# Patient Record
Sex: MALE | Race: BLACK OR AFRICAN AMERICAN | NOT HISPANIC OR LATINO | Employment: UNEMPLOYED | URBAN - METROPOLITAN AREA
[De-identification: names, ages, dates, MRNs, and addresses within clinical notes are randomized per-mention and may not be internally consistent; named-entity substitution may affect disease eponyms.]

---

## 2017-08-03 ENCOUNTER — OFFICE VISIT (OUTPATIENT)
Dept: INTERNAL MEDICINE | Age: 57
End: 2017-08-03

## 2017-08-03 VITALS
HEIGHT: 68 IN | TEMPERATURE: 97 F | BODY MASS INDEX: 29.83 KG/M2 | WEIGHT: 196.8 LBS | OXYGEN SATURATION: 98 % | DIASTOLIC BLOOD PRESSURE: 88 MMHG | SYSTOLIC BLOOD PRESSURE: 132 MMHG | HEART RATE: 72 BPM

## 2017-08-03 DIAGNOSIS — E10.9 TYPE 1 DIABETES MELLITUS WITHOUT COMPLICATION (HCC): ICD-10-CM

## 2017-08-03 DIAGNOSIS — I10 ESSENTIAL HYPERTENSION: Primary | ICD-10-CM

## 2017-08-03 DIAGNOSIS — Z76.89 ENCOUNTER TO ESTABLISH CARE: ICD-10-CM

## 2017-08-03 DIAGNOSIS — Z00.00 PREVENTATIVE HEALTH CARE: ICD-10-CM

## 2017-08-03 DIAGNOSIS — E78.5 HYPERLIPIDEMIA, UNSPECIFIED HYPERLIPIDEMIA TYPE: ICD-10-CM

## 2017-08-03 PROBLEM — D33.2 BENIGN BRAIN TUMOR (HCC): Status: ACTIVE | Noted: 2017-08-03

## 2017-08-03 LAB
25(OH)D3+25(OH)D2 SERPL-MCNC: 29.4 NG/ML (ref 30–100)
ALBUMIN SERPL-MCNC: 4.5 G/DL (ref 3.5–5.2)
ALBUMIN/GLOB SERPL: 1.5 G/DL
ALP SERPL-CCNC: 70 U/L (ref 39–117)
ALT SERPL-CCNC: 29 U/L (ref 1–41)
APPEARANCE UR: CLEAR
AST SERPL-CCNC: 28 U/L (ref 1–40)
BASOPHILS # BLD AUTO: 0.02 10*3/MM3 (ref 0–0.2)
BASOPHILS NFR BLD AUTO: 0.5 % (ref 0–1.5)
BILIRUB SERPL-MCNC: 0.4 MG/DL (ref 0.1–1.2)
BILIRUB UR QL STRIP: NEGATIVE
BUN SERPL-MCNC: 15 MG/DL (ref 6–20)
BUN/CREAT SERPL: 13.9 (ref 7–25)
CALCIUM SERPL-MCNC: 10.2 MG/DL (ref 8.6–10.5)
CHLORIDE SERPL-SCNC: 100 MMOL/L (ref 98–107)
CHOLEST SERPL-MCNC: 137 MG/DL (ref 0–200)
CHOLEST/HDLC SERPL: 2.28 {RATIO}
CO2 SERPL-SCNC: 28.1 MMOL/L (ref 22–29)
COLOR UR: YELLOW
CREAT SERPL-MCNC: 1.08 MG/DL (ref 0.76–1.27)
EOSINOPHIL # BLD AUTO: 0.28 10*3/MM3 (ref 0–0.7)
EOSINOPHIL NFR BLD AUTO: 7.2 % (ref 0.3–6.2)
ERYTHROCYTE [DISTWIDTH] IN BLOOD BY AUTOMATED COUNT: 13.9 % (ref 11.5–14.5)
GLOBULIN SER CALC-MCNC: 3.1 GM/DL
GLUCOSE SERPL-MCNC: 185 MG/DL (ref 65–99)
GLUCOSE UR QL: NEGATIVE
HBA1C MFR BLD: 7.9 % (ref 4.8–5.6)
HCT VFR BLD AUTO: 40.9 % (ref 40.4–52.2)
HDLC SERPL-MCNC: 60 MG/DL (ref 40–60)
HGB BLD-MCNC: 13.4 G/DL (ref 13.7–17.6)
HGB UR QL STRIP: NEGATIVE
IMM GRANULOCYTES # BLD: 0 10*3/MM3 (ref 0–0.03)
IMM GRANULOCYTES NFR BLD: 0 % (ref 0–0.5)
KETONES UR QL STRIP: NEGATIVE
LDLC SERPL CALC-MCNC: 64 MG/DL (ref 0–100)
LEUKOCYTE ESTERASE UR QL STRIP: NEGATIVE
LYMPHOCYTES # BLD AUTO: 0.87 10*3/MM3 (ref 0.9–4.8)
LYMPHOCYTES NFR BLD AUTO: 22.3 % (ref 19.6–45.3)
MCH RBC QN AUTO: 27.4 PG (ref 27–32.7)
MCHC RBC AUTO-ENTMCNC: 32.8 G/DL (ref 32.6–36.4)
MCV RBC AUTO: 83.6 FL (ref 79.8–96.2)
MONOCYTES # BLD AUTO: 0.43 10*3/MM3 (ref 0.2–1.2)
MONOCYTES NFR BLD AUTO: 11 % (ref 5–12)
NEUTROPHILS # BLD AUTO: 2.3 10*3/MM3 (ref 1.9–8.1)
NEUTROPHILS NFR BLD AUTO: 59 % (ref 42.7–76)
NITRITE UR QL STRIP: NEGATIVE
PH UR STRIP: 6.5 [PH] (ref 5–8)
PLATELET # BLD AUTO: 273 10*3/MM3 (ref 140–500)
POTASSIUM SERPL-SCNC: 4.9 MMOL/L (ref 3.5–5.2)
PROT SERPL-MCNC: 7.6 G/DL (ref 6–8.5)
PROT UR QL STRIP: NEGATIVE
PSA SERPL-MCNC: 1.18 NG/ML (ref 0–4)
RBC # BLD AUTO: 4.89 10*6/MM3 (ref 4.6–6)
SODIUM SERPL-SCNC: 140 MMOL/L (ref 136–145)
SP GR UR: 1.01 (ref 1–1.03)
T4 FREE SERPL-MCNC: 1.13 NG/DL (ref 0.93–1.7)
TRIGL SERPL-MCNC: 67 MG/DL (ref 0–150)
TSH SERPL DL<=0.005 MIU/L-ACNC: 1.62 MIU/ML (ref 0.27–4.2)
UROBILINOGEN UR STRIP-MCNC: NORMAL MG/DL
VLDLC SERPL CALC-MCNC: 13.4 MG/DL (ref 5–40)
WBC # BLD AUTO: 3.9 10*3/MM3 (ref 4.5–10.7)

## 2017-08-03 PROCEDURE — 99204 OFFICE O/P NEW MOD 45 MIN: CPT | Performed by: NURSE PRACTITIONER

## 2017-08-03 RX ORDER — LISINOPRIL 10 MG/1
10 TABLET ORAL DAILY
COMMUNITY
End: 2017-08-31 | Stop reason: SDUPTHER

## 2017-08-03 RX ORDER — CETIRIZINE HYDROCHLORIDE 10 MG/1
10 TABLET ORAL DAILY
COMMUNITY
End: 2017-08-31 | Stop reason: SDUPTHER

## 2017-08-03 RX ORDER — ASPIRIN 81 MG/1
81 TABLET ORAL DAILY
COMMUNITY

## 2017-08-03 RX ORDER — MULTIPLE VITAMINS W/ MINERALS TAB 9MG-400MCG
1 TAB ORAL DAILY
COMMUNITY

## 2017-08-03 RX ORDER — HYDROCHLOROTHIAZIDE 25 MG/1
25 TABLET ORAL DAILY
COMMUNITY
End: 2017-08-31 | Stop reason: SDUPTHER

## 2017-08-03 RX ORDER — SIMVASTATIN 20 MG
20 TABLET ORAL NIGHTLY
COMMUNITY
End: 2017-08-31 | Stop reason: SDUPTHER

## 2017-08-03 NOTE — PROGRESS NOTES
"Fred Nagel / 56 y.o. / male  08/03/2017    VITALS:    /88  Pulse 72  Temp 97 °F (36.1 °C)  Ht 68\" (172.7 cm)  Wt 196 lb 12.8 oz (89.3 kg)  SpO2 98%  BMI 29.92 kg/m2  BP Readings from Last 3 Encounters:   08/03/17 132/88     Wt Readings from Last 3 Encounters:   08/03/17 196 lb 12.8 oz (89.3 kg)      Body mass index is 29.92 kg/(m^2).    CC: Main reason(s) for today's visit: Establish Care (new pt to establish care)      HPI:    Patient is a 56 y.o. male who is here to establish care. Patient is a previous patient of Dr. Huff ([facility]) while in [place]. Last visit was in April 2017. Recently moved to [place] in May 2017. His daughter works as a pediatrician in [place] with [employer].     Hypertension: Patient here for follow-up of elevated blood pressure. He is exercising and is adherent to low salt diet.  Blood pressure is well controlled at home. Cardiac symptoms none. Patient denies none.  Cardiovascular risk factors: advanced age (older than 55 for men, 65 for women), diabetes mellitus, hypertension and male gender. Use of agents associated with hypertension: none. History of target organ damage: none.  Patient does not check Blood pressure at home.     Diabetes Mellitus: Patient presents for follow up of diabetes. Symptoms: none. Symptoms have stabilized. Patient denies none.  Evaluation to date has been included: fasting blood sugar, fasting lipid panel, hemoglobin A1C and microalbuminuria.  Home sugars: BGs have been labile ranging between 68 and 280. Treatment to date: Continued insulin which has been effective.   Previous endocrinologist Dr. Goldsmith, last seen in April 2017.         Patient Care Team:  MARCELLA Prasad as PCP - General (Internal Medicine)  ____________________________________________________________________    ASSESSMENT & PLAN:    1. Essential hypertension    - CBC & Differential  - Comprehensive Metabolic Panel  - Hemoglobin A1c  - Lipid Panel " With / Chol / HDL Ratio  - PSA  - TSH+Free T4  - Urinalysis With / Microscopic If Indicated  - Vitamin D 25 Hydroxy    2. Hyperlipidemia, unspecified hyperlipidemia type    - Lipid Panel With / Chol / HDL Ratio    3. Type 1 diabetes mellitus without complication    - CBC & Differential  - Comprehensive Metabolic Panel  - Hemoglobin A1c  - Ambulatory Referral to Endocrinology  - Ambulatory Referral to Ophthalmology    4. Preventative health care    - CBC & Differential  - Comprehensive Metabolic Panel  - Hemoglobin A1c  - Lipid Panel With / Chol / HDL Ratio  - PSA  - TSH+Free T4  - Urinalysis With / Microscopic If Indicated  - Vitamin D 25 Hydroxy    5. Encounter to establish care    - CBC & Differential  - Comprehensive Metabolic Panel  - Hemoglobin A1c  - Lipid Panel With / Chol / HDL Ratio  - PSA  - TSH+Free T4  - Urinalysis With / Microscopic If Indicated  - Vitamin D 25 Hydroxy      Summary/Discussion:   I personally reviewed patient's previous most recent lab results from his previous provider at [place] from March 2017.  Last hemoglobin A1c was 7.8.  Other labs are all within normal limits, including CMP, CBC, microalbumin and urine, lipid panel, PSA.  I will obtain labs today as well.  Patient to sign a records release form to obtain previous medical records from office in [place].  He does report that he has a benign brain nodule that was being monitored with scans every few years, unknown last scan. I also provided patient with information regarding triple screen.      Return in about 3 months (around 11/3/2017) for Recheck, Next scheduled follow up.    Future Appointments  Date Time Provider Department Center   11/2/2017 8:00 AM MARCELLA Prasad [facility] None       ____________________________________________________________________    REVIEW OF SYSTEMS    Review of Systems   Constitutional: Negative for chills and fever.   Respiratory: Negative for apnea, chest tightness and shortness  of breath.    Cardiovascular: Negative for chest pain, palpitations and leg swelling.   Gastrointestinal: Negative for nausea and vomiting.   Neurological: Negative for dizziness, light-headedness and headaches.   Psychiatric/Behavioral: Negative for self-injury, sleep disturbance and suicidal ideas. The patient is not nervous/anxious.      Other: As noted per HPI      PHYSICAL EXAMINATION    Physical Exam   Constitutional: He is oriented to person, place, and time. Vital signs are normal. He appears well-developed and well-nourished. He is cooperative. He does not appear ill. No distress.   Cardiovascular: Normal rate, regular rhythm, S1 normal, S2 normal and normal heart sounds.    No murmur heard.  Pulmonary/Chest: Effort normal and breath sounds normal. He has no decreased breath sounds. He has no wheezes. He has no rhonchi. He has no rales.   Neurological: He is alert and oriented to person, place, and time.   Skin: Skin is warm, dry and intact.   Psychiatric: He has a normal mood and affect. His speech is normal and behavior is normal. Judgment and thought content normal. Cognition and memory are normal.   Nursing note and vitals reviewed.      REVIEWED DATA:    Labs:   No results found for: NA, K, AST, ALT, BUN, CREATININE, EGFRIFNONA, EGFRIFAFRI    No results found for: GLU, HGBA1C, MICROALBUR    No results found for: LDL, HDL, TRIG, CHOLHDLRATIO    No results found for: TSH, FREET4     No results found for: WBC, HGB, PLT      Imaging:        Medical Tests:        Summary of old records / correspondence / consultant report:        Request outside records:        ______________________________________________________________________    No Known Allergies    No current outpatient prescriptions on file prior to visit.     No current facility-administered medications on file prior to visit.        PFSH:     The following portions of the patient's history were reviewed and updated as appropriate: Allergies /  Current Medications / Past Medical History / Surgical History / Social History / Family History    Patient Active Problem List   Diagnosis   • Type 1 diabetes mellitus without complication   • Hyperlipidemia   • Essential hypertension   • Benign brain tumor       Past Medical History:   Diagnosis Date   • Allergic    • Benign brain tumor    • Diabetes mellitus 2012    Type 1 Diabetes   • DKA, type 1    • Hyperlipidemia    • Hypertension        Past Surgical History:   Procedure Laterality Date   • ACHILLES TENDON REPAIR         Social History     Social History   • Marital status:      Spouse name: N/A   • Number of children: N/A   • Years of education: N/A     Social History Main Topics   • Smoking status: Former Smoker     Years: 20.00     Quit date: 1996   • Smokeless tobacco: Never Used   • Alcohol use No      Comment: quit 1989   • Drug use: No   • Sexual activity: Yes     Partners: Female      Comment:  (wife)      Other Topics Concern   • None     Social History Narrative   • None       Family History   Problem Relation Age of Onset   • Cancer Mother    • Cancer Father    • Hypertension Sister    • Hypertension Brother          **Dragon Disclaimer:   Much of this encounter note is an electronic transcription/translation of spoken language to printed text. The electronic translation of spoken language may permit erroneous, or at times, nonsensical words or phrases to be inadvertently transcribed. Although I have reviewed the note for such errors, some may still exist.

## 2017-08-04 ENCOUNTER — TELEPHONE (OUTPATIENT)
Dept: INTERNAL MEDICINE | Age: 57
End: 2017-08-04

## 2017-08-04 NOTE — TELEPHONE ENCOUNTER
Called pt regarding lab results. Pt was informed of all normal lab results. Pt was informed of slightly low Vit D level; pt was advised to take OTC Vit D3 1000iu daily to maintain Vit D.  Pt demonstrated understanding.    KD

## 2017-08-04 NOTE — TELEPHONE ENCOUNTER
"----- Message from MARCELLA Prasad sent at 8/4/2017  7:40 AM EDT -----  Please call patient with results.     Your CBC looks normal, no sign of anemia or platelet disorder.   Your CMP labs are all normal. This is a measure of kidney function, electrolytes, and liver function. Your fasting glucose is elevated at 185.   Hemoglobin A1c is 7.9  Your cholesterol/lipid levels look good at this point. Total cholesterol is perfect at 137 (should be less than 200). LDL cholesterol (\"bad cholesterol\") and HDL cholesterol (\"good cholesterol\") are both at normal levels.   Your PSA (prostate level) is in normal range.   Your tests for thyroid function are normal.   Your urinalysis is normal, no sign of infection, blood, or protein in the urine.    Vitamin D level is just barely low (29.4, normal > 30). You can take OTC Vitamin D3 1000iu daily if you would like to maintain/ slightly increase this.             "

## 2017-08-17 ENCOUNTER — OFFICE VISIT (OUTPATIENT)
Dept: ENDOCRINOLOGY | Age: 57
End: 2017-08-17

## 2017-08-17 VITALS
WEIGHT: 196 LBS | DIASTOLIC BLOOD PRESSURE: 78 MMHG | HEIGHT: 68 IN | BODY MASS INDEX: 29.7 KG/M2 | SYSTOLIC BLOOD PRESSURE: 124 MMHG

## 2017-08-17 DIAGNOSIS — I10 ESSENTIAL HYPERTENSION: ICD-10-CM

## 2017-08-17 DIAGNOSIS — E10.9 TYPE 1 DIABETES MELLITUS WITHOUT COMPLICATION (HCC): Primary | ICD-10-CM

## 2017-08-17 DIAGNOSIS — E78.2 MIXED HYPERLIPIDEMIA: ICD-10-CM

## 2017-08-17 PROCEDURE — 99204 OFFICE O/P NEW MOD 45 MIN: CPT | Performed by: NURSE PRACTITIONER

## 2017-08-17 NOTE — PATIENT INSTRUCTIONS
home blood tests -  Blood glucose testin times daily, that are: fasting- 1st thing in morning before eating or drinking, before each meal and 1 or 2 hours after meal  Bedtime, anytime you feel symptoms of hyperglycemia or hypoglycemia (high or low blood sugars)    New instructions for basal insulIn  Lantus U100 10 units in PM  Increase the PM dose 1 units every 1 days if fasting blood glucose is over 110 mg/dl     1 unit for every 35 above 150 mg/dl - this is done with each meal  4 units before breakfast, 4 units before lunch, 4 units before dinner

## 2017-08-17 NOTE — PROGRESS NOTES
Fred Nagel who presents for for evaluation and treatment of Type 1 diabetes mellitus insulin dependent. The initial diagnosis of diabetes was made 6 years ago.  Patient reports that he was diagnosed as a physical at work after signs and symptoms of fatigue weakness and having to drink energy drinks for energy.  At that primary care providers visit his blood glucose was running 500 mg/Sid.    The condition of diabetes location is system with the course being clinical course has fluctuated , the severity is Moderate , and the modifying/allievating factors are  insulin. Insulin dosage review with Fred Nagel suggested compliance most of the time   .       Associated symptoms of hyperglycemia have been none.  Associated symptoms of hypoglycemia have been hunger and weakness. Patient reports  hypoglycemia threshold for symptoms is 60 mg/dl .       The patient is currently taking home blood tests - Blood glucose testin-3 times daily, that are:  fasting- 1st thing in morning before eating or drinking  before each meal   basal insulIn  Lantus U100 20 units in PM  Novolog U100 4 units with each meal plus s/s     Compliance with blood glucose monitoring: good.     Exercise:daily with meal panning: The patient is using avoidance of concentrated sweets.    Home blood glucose testing daily: 2-3  FB to 160 mg/dl  before lunch 200 to 250 mg/dl  before dinner/supper 200 to 250 mg/dl    Patterns reported per patient are none. Patient reports hypoglycemia with activity or when eats late.       The following portions of the patient's history were reviewed and updated as appropriate: current medications, past family history, past medical history, past social history, past surgical history and problem list.        Medical records, chart, and labs reviewed from primary care provider.      Current Outpatient Prescriptions:   •  aspirin 81 MG EC tablet, Take 81 mg by mouth Daily., Disp: , Rfl:   •  cetirizine (zyrTEC) 10  "MG tablet, Take 10 mg by mouth Daily., Disp: , Rfl:   •  glucose blood (PRECISION XTRA TEST STRIPS) test strip, 1 each by Other route As Needed. Use as instructed, Disp: , Rfl:   •  hydrochlorothiazide (HYDRODIURIL) 25 MG tablet, Take 25 mg by mouth Daily., Disp: , Rfl:   •  insulin aspart (NOVOLOG FLEXPEN) 100 UNIT/ML solution pen-injector sc pen, Inject 4 Units under the skin 3 (Three) Times a Day With Meals. Per sliding scale , Disp: , Rfl:   •  Insulin Glargine (LANTUS SOLOSTAR) 100 UNIT/ML injection pen, Inject 20 Units under the skin Every Night., Disp: , Rfl:   •  lisinopril (PRINIVIL,ZESTRIL) 10 MG tablet, Take 10 mg by mouth Daily., Disp: , Rfl:   •  Multiple Vitamins-Minerals (MULTIVITAMIN WITH MINERALS) tablet tablet, Take 1 tablet by mouth Daily., Disp: , Rfl:   •  simvastatin (ZOCOR) 20 MG tablet, Take 20 mg by mouth Every Night., Disp: , Rfl:     Patient Active Problem List    Diagnosis   • Type 1 diabetes mellitus without complication [E10.9]   • Hyperlipidemia [E78.5]   • Essential hypertension [I10]   • Benign brain tumor [D33.2]       Review of Systems  A comprehensive review of the 14 systems was negative except of listed below:  Genitourinary:positive for frequency and nocturia  Endocrine: hyperglycemia     Objective:     Wt Readings from Last 3 Encounters:   08/17/17 196 lb (88.9 kg)   08/03/17 196 lb 12.8 oz (89.3 kg)     Temp Readings from Last 3 Encounters:   08/03/17 97 °F (36.1 °C)     BP Readings from Last 3 Encounters:   08/17/17 124/78   08/03/17 132/88     Pulse Readings from Last 3 Encounters:   08/03/17 72         /78  Ht 68\" (172.7 cm)  Wt 196 lb (88.9 kg)  BMI 29.8 kg/m2    General appearance:  alert, cooperative,orientated, , fatigued, nourished\" \"appears stated age and cooperative\" \"NAD   Neck: no carotid bruit, supple, symmetrical, trachea midline and thyroid not enlarged, symmetric, no tenderness/mass/nodules   Thyroid:  no palpable nodule, no tenderness, no " "enlargement,    Lung:  \"clear to auscultation bilaterally\" \"no abnormal breath sounds  \" effort was normal, no labored breath, no use of accessory muscles.   Heart: regular rate and rhythm, S1, S2 normal, no murmur, click, rub or gallop      Abdomen:  normal bowel sounds- 4 quads, soft non-tender and contour - slt rounded      Extremities: [extremities normal, atraumatic, no cyanosis or edema\" WNL - gait and station, strength and stability\"          Skin:   Pulses:  warm and dry, no hyperpigmentation, normal skin coloring, or suspicious lesions  2+ and symmetric   Neuro: Alert and oriented x3. Gait normal. Reflexes and motor strength normal and symmetric. Cranial nerves 2-12 and sensation grossly intact.      Psych: behavior - normal, judgement - normal, mood - normal, affect - normal                 Lab Review  Results for orders placed or performed in visit on 08/03/17   Comprehensive Metabolic Panel   Result Value Ref Range    Glucose 185 (H) 65 - 99 mg/dL    BUN 15 6 - 20 mg/dL    Creatinine 1.08 0.76 - 1.27 mg/dL    eGFR Non African Am 71 >60 mL/min/1.73    eGFR African Am 86 >60 mL/min/1.73    BUN/Creatinine Ratio 13.9 7.0 - 25.0    Sodium 140 136 - 145 mmol/L    Potassium 4.9 3.5 - 5.2 mmol/L    Chloride 100 98 - 107 mmol/L    Total CO2 28.1 22.0 - 29.0 mmol/L    Calcium 10.2 8.6 - 10.5 mg/dL    Total Protein 7.6 6.0 - 8.5 g/dL    Albumin 4.50 3.50 - 5.20 g/dL    Globulin 3.1 gm/dL    A/G Ratio 1.5 g/dL    Total Bilirubin 0.4 0.1 - 1.2 mg/dL    Alkaline Phosphatase 70 39 - 117 U/L    AST (SGOT) 28 1 - 40 U/L    ALT (SGPT) 29 1 - 41 U/L   Hemoglobin A1c   Result Value Ref Range    Hemoglobin A1C 7.90 (H) 4.80 - 5.60 %   Lipid Panel With / Chol / HDL Ratio   Result Value Ref Range    Total Cholesterol 137 0 - 200 mg/dL    Triglycerides 67 0 - 150 mg/dL    HDL Cholesterol 60 40 - 60 mg/dL    VLDL Cholesterol 13.4 5 - 40 mg/dL    LDL Cholesterol  64 0 - 100 mg/dL    Chol/HDL Ratio 2.28    PSA   Result Value Ref " Range    PSA 1.180 0.000 - 4.000 ng/mL   TSH+Free T4   Result Value Ref Range    TSH 1.620 0.270 - 4.200 mIU/mL    Free T4 1.13 0.93 - 1.70 ng/dL   Urinalysis With / Microscopic If Indicated   Result Value Ref Range    Specific Gravity, UA 1.015 1.005 - 1.030    pH, UA 6.5 5.0 - 8.0    Color, UA Yellow     Appearance, UA Clear Clear    Leukocytes, UA Negative Negative    Protein Negative Negative    Glucose, UA Negative Negative    Ketones Negative Negative    Blood, UA Negative Negative    Bilirubin, UA Negative Negative    Urobilinogen, UA Comment     Nitrite, UA Negative Negative   Vitamin D 25 Hydroxy   Result Value Ref Range    25 Hydroxy, Vitamin D 29.4 (L) 30.0 - 100.0 ng/mL   CBC & Differential   Result Value Ref Range    WBC 3.90 (L) 4.50 - 10.70 10*3/mm3    RBC 4.89 4.60 - 6.00 10*6/mm3    Hemoglobin 13.4 (L) 13.7 - 17.6 g/dL    Hematocrit 40.9 40.4 - 52.2 %    MCV 83.6 79.8 - 96.2 fL    MCH 27.4 27.0 - 32.7 pg    MCHC 32.8 32.6 - 36.4 g/dL    RDW 13.9 11.5 - 14.5 %    Platelets 273 140 - 500 10*3/mm3    Neutrophil Rel % 59.0 42.7 - 76.0 %    Lymphocyte Rel % 22.3 19.6 - 45.3 %    Monocyte Rel % 11.0 5.0 - 12.0 %    Eosinophil Rel % 7.2 (H) 0.3 - 6.2 %    Basophil Rel % 0.5 0.0 - 1.5 %    Neutrophils Absolute 2.30 1.90 - 8.10 10*3/mm3    Lymphocytes Absolute 0.87 (L) 0.90 - 4.80 10*3/mm3    Monocytes Absolute 0.43 0.20 - 1.20 10*3/mm3    Eosinophils Absolute 0.28 0.00 - 0.70 10*3/mm3    Basophils Absolute 0.02 0.00 - 0.20 10*3/mm3    Immature Granulocyte Rel % 0.0 0.0 - 0.5 %    Immature Grans Absolute 0.00 0.00 - 0.03 10*3/mm3         Assessment:   Fred was seen today for diabetes.    Diagnoses and all orders for this visit:    Type 1 diabetes mellitus without complication  -     Cancel: Comprehensive Metabolic Panel  -     C-Peptide  -     Cancel: Hemoglobin A1c  -     Insulin, Total  -     Microalbumin / Creatinine Urine Ratio  -     Uric Acid  -     Cancel: Vitamin D 25 Hydroxy  -     Cancel: TSH  -      Cancel: Thyroid Antibodies  -     Cancel: T4, Free  -     Cancel: T4  -     Cancel: T3, Free  -     Cancel: T3  -     Glutamic Acid Decarboxylase  -     Anti-islet Cell Antibody    Essential hypertension  -     Cancel: Comprehensive Metabolic Panel    Mixed hyperlipidemia  -     Cancel: Comprehensive Metabolic Panel  -     Cancel: Lipid Panel        Plan:    In Summary: Fred Nagel who presents for for evaluation and treatment of Type 1 diabetes mellitus insulin dependent. The initial diagnosis of diabetes was made 6 years ago.  Patient reports that he was diagnosed as a physical at work after signs and symptoms of fatigue weakness and having to drink energy drinks for energy.  At that primary care providers visit his blood glucose was running 500 mg/Sid. Patient was also tired upon diagnosis that he was making insulin but eventually his body would stop making insulin so therefore he was diagnosed as a type I diabetic.  The condition of diabetes and the course being clinical course has been steady with the severity is Moderately controlled.  The modifying/allievating factors have been  Insulin.  Current medication regiment is: basal insulIn  Lantus U100 20 units in PM and Novolog U100 4 units with each meal plus s/s- at 200 he takes 2 units, 2 50-3 units and 300 and above 4 units.  Insulin dosage review with Fred Nagel suggested compliance most of the time . The patient reports associated symptoms of hyperglycemia have been none and associated symptoms of hypoglycemia have been hunger and weakness. Patient reports  hypoglycemia threshold for symptoms is 60 mg/dl . The patient is currently taking home blood tests - Blood glucose testin-3 times daily. The reported values are: FB to 160 mg/dl, before lunch 200 to 250 mg/dl, and before dinner/supper 200 to 250 mg/dl. Patterns reported per patient are none. Patient reports hypoglycemia with activity or when eats late. The patient's history were  reviewed and updated as appropriate: current medications, past family history, past medical history, past social history, past surgical history and problem list.   There is no significant history of diabetes in the family. Medical records, chart, and labs reviewed from primary care provider.  Recent lab work was reviewed from primary care provider dated August 3, 2017 at this time additional lab work will be obtained and treat as indicated with results.  The medication changes today were as follows:      New instructions for basal insulIn  Lantus U100 10 units in PM  Increase the PM dose 1 units every 1 days if fasting blood glucose is over 110 mg/dl     1 unit for every 35 above 150 mg/dl - this is done with each meal  4 units before breakfast, 4 units before lunch, 4 units before dinner        home blood tests -  Blood glucose testin-6 times daily, that are:  fasting- 1st thing in morning before eating or drinking  before each meal and 1 or 2 hours after meal  bedtime  anytime you feel symptoms of hyperglycemia or hypoglycemia (high or low blood sugars)        Education:  interpretation of lab results, blood sugar goals, complications of diabetes mellitus, hypoglycemia prevention and treatment, exercise, illness management, self-monitoring of blood glucose skills, nutrition, carbohydrate counting, site rotation, use of insulin pen, insulin adjustments, self-injection of insulin, use of sliding scale/correction formula and use of insulin: carb ratio    Instructions were given and education was given on continuous glucose monitoring, and insulin pump therapy.  Future treatment possible after lab work will be obtained for possible 670 G insulin pump with sensor.  We will need log books and lab work to determine eligibility.    Return in about 6 weeks (around 2017).        Dragon transcription disclaimer     Much of this encounter note is an electronic transcription/translation of spoken language to printed  text. The electronic translation of spoken language may permit erroneous, or at times, nonsensical words or phrases to be inadvertently transcribed. Although I have reviewed the note for such errors, some may still exist.

## 2017-08-22 LAB
C PEPTIDE SERPL-MCNC: 0.6 NG/ML (ref 1.1–4.4)
GAD65 AB SER IA-ACNC: NORMAL U/ML (ref 0–5)
INSULIN SERPL-ACNC: 5.4 UIU/ML (ref 2.6–24.9)
PANC ISLET CELL AB TITR SER: ABNORMAL {TITER}
URATE SERPL-MCNC: 4.4 MG/DL (ref 3.4–7)

## 2017-08-31 ENCOUNTER — TELEPHONE (OUTPATIENT)
Dept: INTERNAL MEDICINE | Age: 57
End: 2017-08-31

## 2017-08-31 DIAGNOSIS — E78.2 MIXED HYPERLIPIDEMIA: ICD-10-CM

## 2017-08-31 DIAGNOSIS — J30.2 SEASONAL ALLERGIC RHINITIS, UNSPECIFIED ALLERGIC RHINITIS TRIGGER: ICD-10-CM

## 2017-08-31 DIAGNOSIS — I10 ESSENTIAL HYPERTENSION: Primary | ICD-10-CM

## 2017-08-31 RX ORDER — SIMVASTATIN 20 MG
20 TABLET ORAL NIGHTLY
Qty: 90 TABLET | Refills: 0 | Status: SHIPPED | OUTPATIENT
Start: 2017-08-31 | End: 2017-11-08 | Stop reason: SDUPTHER

## 2017-08-31 RX ORDER — HYDROCHLOROTHIAZIDE 25 MG/1
25 TABLET ORAL DAILY
Qty: 90 TABLET | Refills: 0 | Status: SHIPPED | OUTPATIENT
Start: 2017-08-31 | End: 2017-11-08 | Stop reason: SDUPTHER

## 2017-08-31 RX ORDER — CETIRIZINE HYDROCHLORIDE 10 MG/1
10 TABLET ORAL DAILY
Qty: 90 TABLET | Refills: 0 | Status: SHIPPED | OUTPATIENT
Start: 2017-08-31 | End: 2017-11-08 | Stop reason: SDUPTHER

## 2017-08-31 RX ORDER — LISINOPRIL 10 MG/1
10 TABLET ORAL DAILY
Qty: 90 TABLET | Refills: 0 | Status: SHIPPED | OUTPATIENT
Start: 2017-08-31 | End: 2017-11-08 | Stop reason: SDUPTHER

## 2017-10-19 ENCOUNTER — OFFICE VISIT (OUTPATIENT)
Dept: ENDOCRINOLOGY | Age: 57
End: 2017-10-19

## 2017-10-19 VITALS
DIASTOLIC BLOOD PRESSURE: 80 MMHG | WEIGHT: 199 LBS | SYSTOLIC BLOOD PRESSURE: 118 MMHG | BODY MASS INDEX: 30.16 KG/M2 | HEIGHT: 68 IN

## 2017-10-19 DIAGNOSIS — I10 ESSENTIAL HYPERTENSION: ICD-10-CM

## 2017-10-19 DIAGNOSIS — E10.9 TYPE 1 DIABETES MELLITUS WITHOUT COMPLICATION (HCC): Primary | ICD-10-CM

## 2017-10-19 DIAGNOSIS — E78.2 MIXED HYPERLIPIDEMIA: ICD-10-CM

## 2017-10-19 DIAGNOSIS — Z23 NEED FOR INFLUENZA VACCINATION: ICD-10-CM

## 2017-10-19 PROCEDURE — 90471 IMMUNIZATION ADMIN: CPT | Performed by: NURSE PRACTITIONER

## 2017-10-19 PROCEDURE — 90656 IIV3 VACC NO PRSV 0.5 ML IM: CPT | Performed by: NURSE PRACTITIONER

## 2017-10-19 PROCEDURE — 99214 OFFICE O/P EST MOD 30 MIN: CPT | Performed by: NURSE PRACTITIONER

## 2017-10-19 NOTE — PROGRESS NOTES
Fred Nagel  presents to the office  for the follow-up appointment for Type 1 diabetes mellitus.     The diabete's condition location is throughout the system with the  clinical course has fluctuated, the severity is Mild, and the modifying/allievating factors are insulin.  Medications and  Dosages were  reviewed with Fred Nagel and suggested that compliance most of the time.    Associated symptoms of hyperglycemia have been none.  Associated symptoms of hypoglycemia have been hunger and weakness. Patient reports  hypoglycemia threshold for symptoms is 60 mg/dl .     The patient is currently on insulin.    Compliance with blood glucose monitoring: good.     Meal panning: The patient is using avoidance of concentrated sweets.    The patient is currently taking home blood tests - Blood glucose testing: 3-4 times daily, that are:  fasting- 1st thing in morning before eating or drinking  before each meal  bedtime  anytime you feel symptoms of hyperglycemia or hypoglycemia (high or low blood sugars)   basal insulIn  Lantus U100 16 units in PM  Novolog U100 4 units with each meal plus s/s    Last instructions given were:   New instructions for basal insulIn  Lantus U100 10 units in PM  Increase the PM dose 1 units every 1 days if fasting blood glucose is over 110 mg/dl      1 unit for every 35 above 150 mg/dl - this is done with each meal  4 units before breakfast, 4 units before lunch, 4 units before dinner    Home blood glucose testing daily: 3-4  FB to 120 mg/dl - averages 120 mg/dl  before lunch 170 to 180 mg/dl  before dinner/supper 200 to 220 mg/dl  bedtime 105 to 130 mg/dl    Last reported blood glucose readings are:   Home blood glucose testing daily: 2-3  FB to 160 mg/dl  before lunch 200 to 250 mg/dl  before dinner/supper 200 to 250 mg/dl    Patterns reported per patient are that he wakes up with BS higher than it was when he went to bed.          The following portions of the patient's history  were reviewed and updated as appropriate: current medications, past family history, past medical history, past social history, past surgical history and problem list.      Current Outpatient Prescriptions:   •  aspirin 81 MG EC tablet, Take 81 mg by mouth Daily., Disp: , Rfl:   •  cetirizine (zyrTEC) 10 MG tablet, Take 1 tablet by mouth Daily., Disp: 90 tablet, Rfl: 0  •  glucose blood (PRECISION XTRA TEST STRIPS) test strip, 1 each by Other route As Needed. Use as instructed, Disp: , Rfl:   •  hydrochlorothiazide (HYDRODIURIL) 25 MG tablet, Take 1 tablet by mouth Daily., Disp: 90 tablet, Rfl: 0  •  insulin aspart (NOVOLOG FLEXPEN) 100 UNIT/ML solution pen-injector sc pen, Inject 4 Units under the skin 3 (Three) Times a Day With Meals. Per sliding scale , Disp: , Rfl:   •  Insulin Glargine (LANTUS SOLOSTAR) 100 UNIT/ML injection pen, Inject 20 Units under the skin Every Night., Disp: , Rfl:   •  Insulin Pen Needle 31G X 4 MM misc, 1 pen 4 (Four) Times a Day., Disp: 200 each, Rfl: 5  •  lisinopril (PRINIVIL,ZESTRIL) 10 MG tablet, Take 1 tablet by mouth Daily., Disp: 90 tablet, Rfl: 0  •  Multiple Vitamins-Minerals (MULTIVITAMIN WITH MINERALS) tablet tablet, Take 1 tablet by mouth Daily., Disp: , Rfl:   •  simvastatin (ZOCOR) 20 MG tablet, Take 1 tablet by mouth Every Night., Disp: 90 tablet, Rfl: 0    Patient Active Problem List    Diagnosis   • Mixed hyperlipidemia [E78.2]   • Seasonal allergies [J30.2]   • Type 1 diabetes mellitus without complication [E10.9]   • Hyperlipidemia [E78.5]   • Essential hypertension [I10]   • Benign brain tumor [D33.2]       Review of Systems   A comprehensive review of the 14 systems was negative except of listed below:  Endocrine: hyperglycemia     Objective:     Wt Readings from Last 3 Encounters:   10/19/17 199 lb (90.3 kg)   08/17/17 196 lb (88.9 kg)   08/03/17 196 lb 12.8 oz (89.3 kg)     Temp Readings from Last 3 Encounters:   08/03/17 97 °F (36.1 °C)     BP Readings from Last 3  "Encounters:   10/19/17 118/80   08/17/17 124/78   08/03/17 132/88     Pulse Readings from Last 3 Encounters:   08/03/17 72        /80  Ht 68\" (172.7 cm)  Wt 199 lb (90.3 kg)  BMI 30.26 kg/m2    General appearance:  alert, cooperative, delirious, fatigued, nourished\" \"appears stated age and cooperative\" \"NAD   Neck: no carotid bruit, supple, symmetrical, trachea midline and thyroid not enlarged, symmetric, no tenderness/mass/nodules   Thyroid:  no palpable nodule, no enlargement, no tenderness   Lung:  \"clear to auscultation bilaterally\" \"no abnormal breath sounds  \" effort was normal, no labored breath, no use of accessory muscles.   Heart: regular rate and rhythm, S1, S2 normal, no murmur, click, rub or gallop      Abdomen:  normal bowel sounds- 4 quads, soft non-tender and contour - slt rounded      Extremities: extremities normal, atraumatic, no cyanosis or edema extremities normal, atraumatic, no cyanosis or edema\" WNL - gait and station, strength and stability\"       Skin:   Pulses:  warm and dry, no hyperpigmentation, normal skin coloring, or suspicious lesions   2+ and symmetric   Neuro: Alert and oriented x3. Gait normal. Reflexes and motor strength normal and symmetric. Cranial nerves 2-12 and sensation grossly intact.      Psych: behavior - normal, judgement - normal, mood - normal, affect - normal                 Lab Review  Results for orders placed or performed in visit on 08/17/17   C-Peptide   Result Value Ref Range    C-Peptide 0.6 (L) 1.1 - 4.4 ng/mL   Insulin, Total   Result Value Ref Range    Insulin 5.4 2.6 - 24.9 uIU/mL   Uric Acid   Result Value Ref Range    Uric Acid 4.4 3.4 - 7.0 mg/dL   Glutamic Acid Decarboxylase   Result Value Ref Range    [id number] See below. 0.0 - 5.0 U/mL   Anti-islet Cell Antibody   Result Value Ref Range    Islet Cell Ab 1:8 (H) Neg:<1:1           Assessment:   Fred was seen today for follow-up.    Diagnoses and all orders for this visit:    Type 1 diabetes " mellitus without complication    Essential hypertension    Mixed hyperlipidemia          Plan:   In Summary/medication changes:  home blood tests -  Blood glucose testin times daily, that are: fasting- 1st thing in morning before eating or drinking, before each meal and 1 or 2 hours after meal  Bedtime, anytime you feel symptoms of hyperglycemia or hypoglycemia (high or low blood sugars)    New instructions for basal insulIn  Lantus U100 18 units in PM  Increase the PM dose 1 units every 1 days if fasting blood glucose is over 100 mg/dl     Novolog 1unit for every 25 above 120 mg/dl - this is done with each meal  10 units before breakfast, 10 units before lunch, 10 units before dinner          Education:  interpretation of lab results, blood sugar goals, complications of diabetes mellitus, hypoglycemia prevention and treatment, exercise, illness management, self-monitoring of blood glucose skills, nutrition, carbohydrate counting, site rotation, use of insulin pen, insulin adjustments, self-injection of insulin, use of sliding scale/correction formula and use of insulin: carb ratio        Office visit 25 minutes with additional education given 13 minutes - SMBG with goals, medication changes with purposes and s/e profiles.       Return in about 3 months (around 2018).      Dragon transcription disclaimer     Much of this encounter note is an electronic transcription/translation of spoken language to printed text. The electronic translation of spoken language may permit erroneous, or at times, nonsensical words or phrases to be inadvertently transcribed. Although I have reviewed the note for such errors, some may still exist.

## 2017-10-19 NOTE — PATIENT INSTRUCTIONS
home blood tests -  Blood glucose testin times daily, that are: fasting- 1st thing in morning before eating or drinking, before each meal and 1 or 2 hours after meal  Bedtime, anytime you feel symptoms of hyperglycemia or hypoglycemia (high or low blood sugars)    New instructions for basal insulIn  Lantus U100 18 units in PM  Increase the PM dose 1 units every 1 days if fasting blood glucose is over 100 mg/dl     Novolog 1unit for every 25 above 120 mg/dl - this is done with each meal  10 units before breakfast, 10 units before lunch, 10 units before dinner

## 2017-10-27 ENCOUNTER — TREATMENT (OUTPATIENT)
Dept: ENDOCRINOLOGY | Age: 57
End: 2017-10-27

## 2017-10-27 DIAGNOSIS — E10.9 TYPE 1 DIABETES MELLITUS WITHOUT COMPLICATION (HCC): Primary | ICD-10-CM

## 2017-11-02 ENCOUNTER — OFFICE VISIT (OUTPATIENT)
Dept: INTERNAL MEDICINE | Age: 57
End: 2017-11-02

## 2017-11-02 VITALS
SYSTOLIC BLOOD PRESSURE: 122 MMHG | HEART RATE: 78 BPM | DIASTOLIC BLOOD PRESSURE: 80 MMHG | OXYGEN SATURATION: 98 % | HEIGHT: 68 IN | BODY MASS INDEX: 29.8 KG/M2 | WEIGHT: 196.6 LBS | TEMPERATURE: 97.8 F

## 2017-11-02 DIAGNOSIS — E78.2 MIXED HYPERLIPIDEMIA: ICD-10-CM

## 2017-11-02 DIAGNOSIS — I10 ESSENTIAL HYPERTENSION: ICD-10-CM

## 2017-11-02 DIAGNOSIS — E10.9 TYPE 1 DIABETES MELLITUS WITHOUT COMPLICATION (HCC): Primary | ICD-10-CM

## 2017-11-02 PROCEDURE — 99214 OFFICE O/P EST MOD 30 MIN: CPT | Performed by: NURSE PRACTITIONER

## 2017-11-02 RX ORDER — INSULIN GLARGINE 100 [IU]/ML
INJECTION, SOLUTION SUBCUTANEOUS
COMMUNITY
Start: 2017-10-19 | End: 2018-05-07

## 2017-11-02 NOTE — ASSESSMENT & PLAN NOTE
Last HgbA1c was 7.9 6/2017. Currently on Lantus and Novolog with meals. Managed by Endocrinology.

## 2017-11-02 NOTE — PATIENT INSTRUCTIONS
Hypertension  Hypertension, commonly called high blood pressure, is when the force of blood pumping through your arteries is too strong. Your arteries are the blood vessels that carry blood from your heart throughout your body. A blood pressure reading consists of a higher number over a lower number, such as 110/72. The higher number (systolic) is the pressure inside your arteries when your heart pumps. The lower number (diastolic) is the pressure inside your arteries when your heart relaxes. Ideally you want your blood pressure below 120/80.  Hypertension forces your heart to work harder to pump blood. Your arteries may become narrow or stiff. Having untreated or uncontrolled hypertension can cause heart attack, stroke, kidney disease, and other problems.  RISK FACTORS  Some risk factors for high blood pressure are controllable. Others are not.   Risk factors you cannot control include:   · Race. You may be at higher risk if you are .  · Age. Risk increases with age.  · Gender. Men are at higher risk than women before age 45 years. After age 65, women are at higher risk than men.  Risk factors you can control include:  · Not getting enough exercise or physical activity.  · Being overweight.  · Getting too much fat, sugar, calories, or salt in your diet.  · Drinking too much alcohol.  SIGNS AND SYMPTOMS  Hypertension does not usually cause signs or symptoms. Extremely high blood pressure (hypertensive crisis) may cause headache, anxiety, shortness of breath, and nosebleed.  DIAGNOSIS  To check if you have hypertension, your health care provider will measure your blood pressure while you are seated, with your arm held at the level of your heart. It should be measured at least twice using the same arm. Certain conditions can cause a difference in blood pressure between your right and left arms. A blood pressure reading that is higher than normal on one occasion does not mean that you need treatment. If  it is not clear whether you have high blood pressure, you may be asked to return on a different day to have your blood pressure checked again. Or, you may be asked to monitor your blood pressure at home for 1 or more weeks.  TREATMENT  Treating high blood pressure includes making lifestyle changes and possibly taking medicine. Living a healthy lifestyle can help lower high blood pressure. You may need to change some of your habits.  Lifestyle changes may include:  · Following the DASH diet. This diet is high in fruits, vegetables, and whole grains. It is low in salt, red meat, and added sugars.  · Keep your sodium intake below 2,300 mg per day.  · Getting at least 30-45 minutes of aerobic exercise at least 4 times per week.  · Losing weight if necessary.  · Not smoking.  · Limiting alcoholic beverages.  · Learning ways to reduce stress.  Your health care provider may prescribe medicine if lifestyle changes are not enough to get your blood pressure under control, and if one of the following is true:  · You are 18-59 years of age and your systolic blood pressure is above 140.  · You are 60 years of age or older, and your systolic blood pressure is above 150.  · Your diastolic blood pressure is above 90.  · You have diabetes, and your systolic blood pressure is over 140 or your diastolic blood pressure is over 90.  · You have kidney disease and your blood pressure is above 140/90.  · You have heart disease and your blood pressure is above 140/90.  Your personal target blood pressure may vary depending on your medical conditions, your age, and other factors.  HOME CARE INSTRUCTIONS  · Have your blood pressure rechecked as directed by your health care provider.    · Take medicines only as directed by your health care provider. Follow the directions carefully. Blood pressure medicines must be taken as prescribed. The medicine does not work as well when you skip doses. Skipping doses also puts you at risk for  problems.  · Do not smoke.    · Monitor your blood pressure at home as directed by your health care provider.   SEEK MEDICAL CARE IF:   · You think you are having a reaction to medicines taken.  · You have recurrent headaches or feel dizzy.  · You have swelling in your ankles.  · You have trouble with your vision.  SEEK IMMEDIATE MEDICAL CARE IF:  · You develop a severe headache or confusion.  · You have unusual weakness, numbness, or feel faint.  · You have severe chest or abdominal pain.  · You vomit repeatedly.  · You have trouble breathing.  MAKE SURE YOU:   · Understand these instructions.  · Will watch your condition.  · Will get help right away if you are not doing well or get worse.     This information is not intended to replace advice given to you by your health care provider. Make sure you discuss any questions you have with your health care provider.     Document Released: 12/18/2006 Document Revised: 05/03/2016 Document Reviewed: 10/10/2014  [email or web address] Interactive Patient Education ©2017 [employer]    ÍÓÇÈ ÇáßÑÈæåíÏÑÇÊ ÇáÃÓÇÓíÉ áãÑÖì ÇáÓßÑí  (Basic Carbohydrate Counting for Diabetes Mellitus)  íÚÏ ÍÓÇÈ ÇáßÑÈæåíÏÑÇÊ æÓíáÉ áãÊÇÈÚÉ ßãíÉ ÇáßÑÈæåíÏÑÇÊ ÇáÊí ÊÊäÇæáåÇ. Åä ÊäÇæá ÇáßÑÈæåíÏÑÇÊ ÈØÈíÚÉ ÇáÍÇá íÑÝÚ ãÓÊæì ÇáÓßÑ (ÇáÌáæßæÒ) Ýí ÇáÏã¡ áÐÇ ãä ÇáÖÑæÑí ÇáÊÚÑÝ Úáì ßãíÉ ÇáßÑÈæåíÏÑÇÊ ÇáÊí ãä ÇáãäÇÓÈ áß ÊäÇæáåÇ Ýí ßá æÌÈÉ. íÓÇÚÏ ÍÓÇÈ ÇáßÑÈæåíÏÑÇÊ Úáì ÇáÍÝÇÙ Úáì ãÓÊæíÇÊ ÇáÌáæßæÒ Ýí ÇáÏã ÚäÏ ãÚÏáÇÊå ÇáØÈíÚíÉ. æÊÎÊáÝ ßãíÉ ÇáßÑÈæåíÏÑÇÊ ÇáãÓãæÍ ÈåÇ ãä ÔÎÕ áÂÎÑ. íãßä Ãä íÓÇÚÏß ÃÎÕÇÆí ÇáÊÛÐíÉ Úáì ÇÍÊÓÇÈ ÇáßãíÉ ÇáãäÇÓÈÉ áß. ÝæÑ Ãä ÊÚáã ÇáßãíÉ ÇáãäÇÓÈ áß ÊäÇæáåÇ ãä ÇáßÑÈæåíÏÑÇÊ¡ íãßäß ÇÍÊÓÇÈ ßãíÉ ÇáßÑÈæåíÏÑÇÊ ÇáãÊæÝÑÉ Ýí ÇáØÚÇã ÇáÐí ÊÊäÇæáå.  ÊÊæÇÝÑ ÇáßÑÈæåíÏÑÇÊ Ýí ÇáÃÛÐíÉ ÇáÊÇáíÉ:  · ÇáÍÈæÈ¡ ãËá ÇáãÎÈæÒÇÊ æÇáÍÈæÈ.  · ÇáÈÞæáíÇÊ ÇáãÌÝÝÉ æãäÊÌÇÊ ÇáÕæíÇ.  · ÇáÎÖÑÇæÇÊ ÇáäÔæíÉ¡ ãËá ÇáÈØÇØÓ æÇáÈÇÒáÇÁ æÇáÐÑÉ.  · ÇáÝÇßåÉ æÚÕÇÆÑ ÇáÝÇßåÉ.  · ÇááÈä æÇáÒÈÇÏí.  · ÇáÍáæíÇÊ æÇáãÃßæáÇÊ ÇáÓÑíÚÉ¡ ãËá Çáßíß æÇáßÚß æÇáÍáæíÇÊ æÇáÈØÇØÓ  "ÇáãÞáíÉ æÇáãÔÑæÈÇÊ ÇáÓßÑíÉ æÚÕÇÆÑ ÇáÝÇßåÉ.  ÇÍÊÓÇÈ ßãíÉ ÇáßÑÈæåíÏÑÇÊ  ËãÉ ØÑíÞÊÇä áÇÍÊÓÇÈ ßãíÉ ÇáßÑÈæåíÏÑÇÊ ÇáãÊæÝÑÉ Ýí ØÚÇãß. íãßäß ÇÓÊÎÏÇã Ãí ãä ÇáØÑíÞÊíä Ãæ ßáÇåãÇ.  ÞÑÇÁÉ \"ÇáÍÞÇÆÞ ÇáÛÐÇÆíÉ\" ÇáãæÌæÏÉ Úáì ÚÈæÉ ÇáØÚÇã.  \"ÇáÍÞÇÆÞ ÇáÛÐÇÆíÉ\" ÚÈÇÑÉ Úä ÌÒÁ ãä ãáÕÞ ÇáÈíÇäÇÊ ÇáãæÌæÏ Úáì ÃÛáÈ ÇáÃØÚãÉ æÇáãÔÑæÈÇÊ ÇáãÚÈÃÉ ÏÇÎá ÇáæáÇíÇÊ ÇáãÊÍÏÉ. æåæ íÔãá ãÚáæãÇÊ Úä ÍÌã ÇáÍÕÉ ÇáæÇÍÏÉ ãä ÇáØÚÇã Ãæ ÇáãÔÑæÈ æÇáãæÇÏ ÇáÛÐÇÆíÉ ÇáÊí ÊÍÊæíåÇ ßá ÍÕÉ¡ ÈãÇ íÔãá ÚÏÏ ÌÑÇãÇÊ ÇáßÑÈæåíÏÑÇÊ ÇáãæÌæÏÉ Ýí ßá ÍÕÉ.   ÍÏÏ ÚÏÏ ÇáÍÕÕ ÇáÊí íãßä ÊäÇæáåÇ ãä åÐÇ ÇáØÚÇã Ãæ ÇáÔÑÇÈ. ÇÖÑÈ ÚÏÏ ÇáÍÕÕ Ýí ÚÏÏ ÌÑÇãÇÊ ÇáßÑÈæåíÏÑÇÊ ÇáãÏÑÌÉ Úáì ãáÕÞ ÇáÈíÇäÇÊ áåÐå ÇáÍÕÉ. ÓíãËá ÇáÅÌãÇáí ßãíÉ ÇáßÑÈæåíÏÑÇÊ ÇáÊí ÓÊÓÊåáßåÇ ÚäÏ ÊäÇæáß åÐÇ ÇáØÚÇã Ãæ ÇáÔÑÇÈ.  ÇáÊÚÑÝ Úáì ÃÍÌÇã ÇáÍÕÕ ÇáÞíÇÓíÉ ááØÚÇã  ÍíäãÇ ÊÊäÇæá ØÚÇãðÇ ÛíÑ ãÚÈÃ Ãæ áÇ íÔãá \"ÇáÍÞÇÆÞ ÇáÛÐÇÆíÉ\" Úáì ãáÕÞ ÇáÈíÇäÇÊ ÇáÎÇÕ Èå¡ ÝÓíßæä Úáíß ÇÍÊÓÇÈ ÇáÍÕÕ áÊÊãßä ãä ÇÍÊÓÇÈ ßãíÉ ÇáßÑÈæåíÏÑÇÊ. ÊÍÊæí ÇáÍÕÉ ãä ÃßËÑ ÇáÃØÚãÉ ÇáÛäíÉ ÈÇáßÑÈæåíÏÑÇÊ Úáì ãÇ íäÇåÒ 15 Ìã ãä ÇáßÑÈæåíÏÑÇÊ. ÊÊÖãä ÇáÞÇÆãÉ ÇáÊÇáíÉ ÃÍÌÇã ÍÕÕ ÇáãÃßæáÇÊ ÇáÛäíÉ ÈÇáßÑÈæåíÏÑÇÊ æÇáÊí ÊÍÊæí ßá ÍÕÉ ãäåÇ Úáì 15 Ìã ãä ÇáßÑÈæåíÏÑÇÊ:    · ÔÑíÍÉ æÇÍÏÉ ãä ÇáÎÈÒ (1 ÃæÞíÉ) Ãæ ÔÑíÍÉ æÇÍÏÉ ãä ÇáÊæÑÊíáÇ ÈÍÌã 6 ÈæÕÇÊ.    · ½ ÎÈÒ ÔØÑÉ áÍã Ãæ ãÇÝä ÅäÌáíÒí.  · 4-6 ãÞÑãÔÇÊ.  · 3/4 ßæÈ ãä ÇáÍÈæÈ ÇáÌÇÝÉ ÛíÑ ÇáãÍáÇå.    · 1/2 ßæÈ ãä ÇáÍÈæÈ ÇáÓÇÎäÉ.  · 1/3 ßæÈ ãä ÇáÃÑÒ Ãæ ÇáãßÑæäÉ.    · 1/2 ßæÈ ãä ÇáÈØÇØÓ ÇáãåÑæÓÉ Ãæ 1/4 ÍÈÉ ÈØÇØÓ ßÈíÑÉ ãÎÈæÒÉ.  · 1 ßæÈ ãä ÇáÝÇßåÉ ÇáØÇÒÌÉ Ãæ ËãÑÉ ÝÇßåÉ ÕÛíÑÉ æÇÍÏÉ.    · 1/2 ßæÈ ãä ÇáÝÇßåÉ Ãæ ÚÕÇÆÑ ÇáÝÇßåÉ ÇáãÚÈÃÉ Ãæ ÇáãÌãÏÉ.  · 1 ßæÈ áÈä.  · 2/3 ßæÈ ãä ÇáÒÈÇÏí ÇáÓÇÏÉ ÇáÎÇáí ãä ÇáÏåæä Ãæ ÇáÒÈÇÏí ÇáãÍáì ÈãÍáíÇÊ ÕäÇÚíÉ.  · 1/2 ßæÈ ãä ÇáÍÈæÈ ÇáÌÇÝÉ ÇáãØåæÉ Ãæ ÇáÎÖÑÇæÇÊ ÇáäÔæíÉ¡ ãËá ÇáÈÇÒáÇÁ æÇáÐÑÉ æÇáÈØÇØÓ.    ÍÏÏ ÚÏÏ ÇáÍÕÕ ÐÇÊ ÇáÃÍÌÇã ÇáÞíÇÓíÉ ÇáÊí ÓÊÊäÇæáåÇ. ÇÖÑÈ ÚÏÏ ÇáÍÕÕ Ýí 15 (ÚÏÏ ÌÑÇãÇÊ ÇáßÑÈæåíÏÑÇÊ ÇáãæÌæÏÉ Ýí ßá ÍÕÉ). Úáì ÓÈíá ÇáãËÇá¡ ÅÐÇ ÊäÇæáÊ ßæÈíä ãä ÇáÝÑÇæáÉ¡ ÝÓÊßæä ÈÐáß ÞÏ ÊäÇæáÊ ÍÕÊíä æ30 Ìã ãä ÇáßÑÈæåíÏÑÇÊ (2 ÍÕÉ X ÚÏÏ 15 Ìã = 30 Ìã) ÈÇáäÓÈÉ " ááÃØÚãÉ¡ ãËá ÇáÍÓÇÁ æÇáßÓÑæáÇÊ¡ ÇáÊí íÊã ÝíåÇ ãÒÌ ÃßËÑ ãä äæÚ æÇÍÏ ãä ÇáØÚÇã¡ ÓíÊÚíä Úáíß ÇÍÊÓÇÈ ÇáßÑÈæåíÏÑÇÊ ÇáãæÌæÏÉ Ýí ßá äæÚ ØÚÇã.  ãËÇá Úáì ÇÍÊÓÇÈ ÇáßÑÈæåíÏÑÇÊ  ãËÇá áæÌÈÉ ÚÔÇÁ   · 3 ÑØá ãä ÕÏÑ ÇáÏÌÇÌ.  · 2/3 ßæÈ ãä ÇáÃÑÒ ÇáÈäí.  · 1/2 ßæÈ ãä ÇáÐÑÉ.  · 1 ßæÈ áÈä.    · 1 ßæÈ ÝÑÇæáÉ ãÍáÇå ÈßÑíãÉ ÎÇáíÉ ãä ÇáÓßÑ.    ÇÍÊÓÇÈ ÇáßÑÈæåíÏÑÇÊ   ÇáÎØæÉ 1: ÍÏÏ ÇáãÃßæáÇÊ ÇáãÍÊæíÉ Úáì ÇáßÑÈæåíÏÑÇÊ:   · ÇáÃÑÒ.    · ÇáÐÑÉ.    · ÇáÍáíÈ.    · ÇáÝÑÇæáÉ  ÇáÎØæÉ 2: ÇÍÓÈ ÚÏÏ ÇáÍÕÕ ÇáãÊäÇæáÉ ãä ßá äæÚ ØÚÇã:   · ÍÕÊíä ãä ÇáÃÑÒ.    · ÍÕÉ æÇÍÏÉ ãä ÇáÐÑÉ.    · ÍÕÉ æÇÍÏÉ ãä ÇáÍáíÈ.    · ÍÕÉ æÇÍÏÉ ãä ÇáÝÑÇæáÉ.  ÇáÎÙæÉ 3: ÇÖÑÈ åÐå ÇáÃÚÏÇÏ ãä ÇáÍÕÕ Ýí 15 Ìã:   · ÍÕÊÇä ãä ÇáÃÑÒ × 15 Ìã = 30 Ìã.    · ÍÕÉ æÇÍÏÉ ãä ÇáÐÑÉ × 15 Ìã = 15 Ìã.    · ÍÕÉ æÇÍÏÉ ãä ÇáÍáíÈ × 15 Ìã = 15 Ìã.    · ÍÕÉ æÇÍÏÉ ãä ÇáÝÑÇæáÉ × 15 Ìã = 15 Ìã.  ÇáÎØæÉ 4: ÇÌãÚ ÌãíÚ ÇáßãíÇÊ áãÚÑÝÉ ÅÌãÇáí ßãíÉ ÇáßÑÈæåíÏÑÇÊ ÇáÊí ÊäÇæáÊåÇ: 30 Ìã + 15 Ìã + 15 Ìã +15 Ìã = 75 Ìã.     áíÓ ÇáåÏÝ ãä åÐå ÇáãÚáæãÇÊ Ãä Êßæä ÈÏíáÇð ááÅÑÔÇÏÇÊ ÇáÊí íÞÏãåÇ ãæÝÑ ÇáÑÚÇíÉ ÇáÕÍíÉ. ÊÃßÏ ãä ãäÇÞÔÉ ÃíÉ ÃÓÆáÉ ÊÏæÑ Ýí Ðåäß ãÚ ãæÝÑ ÇáÑÚÇíÉ ÇáÕÍíÉ.?     Document Released: 04/10/2017  [employer] Interactive Patient Education ©2017 [employer]

## 2017-11-02 NOTE — PROGRESS NOTES
Subjective   Fred Nagle is a 57 y.o. male.     Hypertension   This is a chronic problem. Pertinent negatives include no anxiety, blurred vision, chest pain, headaches, malaise/fatigue, neck pain, orthopnea, palpitations, peripheral edema, PND, shortness of breath or sweats. There are no associated agents to hypertension. Risk factors for coronary artery disease include diabetes mellitus. Past treatments include ACE inhibitors and diuretics. The current treatment provides no improvement. There are no compliance problems (Does not check BP at home. Reports recent gym membership, working out (weight training and cardio) 4 days/week. ).    Diabetes   He presents for his follow-up diabetic visit. He has type 2 diabetes mellitus. Pertinent negatives for hypoglycemia include no headaches or sweats. Pertinent negatives for diabetes include no blurred vision, no chest pain, no polydipsia, no polyphagia and no polyuria. There are no hypoglycemic complications. There are no diabetic complications. Risk factors for coronary artery disease include diabetes mellitus, hypertension and male sex. Current diabetic treatment includes insulin injections. His weight is stable. When asked about meal planning, he reported none. He has not had a previous visit with a dietitian. There is no change in his home blood glucose trend. His breakfast blood glucose range is generally 110-130 mg/dl. His dinner blood glucose range is generally 110-130 mg/dl. An ACE inhibitor/angiotensin II receptor blocker is being taken. He does not see a podiatrist.Eye exam is current.    Managed by Endocrinology. Taking Lantus and Novolog. Checking BG 4 times daily at home.     The following portions of the patient's history were reviewed and updated as appropriate: allergies, current medications, past family history, past medical history, past social history, past surgical history and problem list.    Review of Systems   Constitutional: Negative for chills,  fever and malaise/fatigue.   Eyes: Negative for blurred vision.   Respiratory: Negative for shortness of breath.    Cardiovascular: Negative for chest pain, palpitations, orthopnea, leg swelling and PND.   Endocrine: Negative for polydipsia, polyphagia and polyuria.   Musculoskeletal: Negative for neck pain.   Neurological: Negative for headaches.       Objective   Physical Exam   Constitutional: He is oriented to person, place, and time. Vital signs are normal. He appears well-developed and well-nourished. He is cooperative. He does not appear ill. No distress.   Cardiovascular: Normal rate, regular rhythm, S1 normal, S2 normal and normal heart sounds.    No murmur heard.  Pulmonary/Chest: Effort normal and breath sounds normal. He has no decreased breath sounds. He has no wheezes. He has no rhonchi. He has no rales.   Neurological: He is alert and oriented to person, place, and time.   Skin: Skin is warm, dry and intact.   Psychiatric: He has a normal mood and affect. His speech is normal and behavior is normal. Judgment and thought content normal. Cognition and memory are normal.   Nursing note and vitals reviewed.      Assessment/Plan   Problems Addressed this Visit        Cardiovascular and Mediastinum    Essential hypertension     Currently well-controlled on HCTZ 25 mg daily, Lisinopril 10mg PO daily          Mixed hyperlipidemia       Endocrine    Type 1 diabetes mellitus without complication - Primary     Last HgbA1c was 7.9 6/2017. Currently on Lantus and Novolog with meals. Managed by Endocrinology.          Relevant Medications    NOVOLOG 100 UNIT/ML injection    LANTUS 100 UNIT/ML injection        1. Type 1 diabetes mellitus without complication  Last HgbA1c was 7.9 6/2017. Currently on Lantus and Novolog with meals. Managed by Endocrinology. Continue current medications as prescribed.       2. Essential hypertension  Blood pressure currently well controlled on combination of lisinopril 10 mg daily and  HCTZ 25 mg daily.  Continue current medications.    3. Mixed hyperlipidemia  Last lipid panel was done in August, all levels within normal limits.  Patient is currently taking Zocor 20 mg daily at bedtime.  Patient will return to office for follow-up of chronic medical conditions as well as complete annual physical in approximately 4 months.  Obtain fasting labs at that time.

## 2017-11-08 DIAGNOSIS — J30.2 SEASONAL ALLERGIC RHINITIS, UNSPECIFIED CHRONICITY, UNSPECIFIED TRIGGER: Primary | ICD-10-CM

## 2017-11-08 DIAGNOSIS — E78.2 MIXED HYPERLIPIDEMIA: ICD-10-CM

## 2017-11-08 DIAGNOSIS — I10 ESSENTIAL HYPERTENSION: ICD-10-CM

## 2017-11-08 RX ORDER — CETIRIZINE HYDROCHLORIDE 10 MG/1
10 TABLET ORAL DAILY
Qty: 90 TABLET | Refills: 0 | Status: SHIPPED | OUTPATIENT
Start: 2017-11-08 | End: 2018-01-21 | Stop reason: SDUPTHER

## 2017-11-08 RX ORDER — LISINOPRIL 10 MG/1
10 TABLET ORAL DAILY
Qty: 90 TABLET | Refills: 0 | Status: SHIPPED | OUTPATIENT
Start: 2017-11-08 | End: 2018-01-21 | Stop reason: SDUPTHER

## 2017-11-08 RX ORDER — HYDROCHLOROTHIAZIDE 25 MG/1
25 TABLET ORAL DAILY
Qty: 90 TABLET | Refills: 0 | Status: SHIPPED | OUTPATIENT
Start: 2017-11-08 | End: 2018-01-08 | Stop reason: SDUPTHER

## 2017-11-08 RX ORDER — SIMVASTATIN 20 MG
20 TABLET ORAL NIGHTLY
Qty: 90 TABLET | Refills: 0 | Status: SHIPPED | OUTPATIENT
Start: 2017-11-08 | End: 2018-01-21 | Stop reason: SDUPTHER

## 2017-12-05 ENCOUNTER — TREATMENT (OUTPATIENT)
Dept: ENDOCRINOLOGY | Age: 57
End: 2017-12-05

## 2017-12-05 DIAGNOSIS — E10.9 TYPE 1 DIABETES MELLITUS WITHOUT COMPLICATION (HCC): Primary | ICD-10-CM

## 2017-12-05 PROCEDURE — 95251 CONT GLUC MNTR ANALYSIS I&R: CPT | Performed by: NURSE PRACTITIONER

## 2017-12-05 NOTE — PROGRESS NOTES
Pro report from October 27 through October 30, 2017    Time in range 7% above 150 mg/Sid  50% in target range and 35% below 70 mg/Sid.  Average glucose 93 mg/Sid  Estimated A1c 4.9%    Patterns.  #1 hypoglycemia overnight 11 PM through 6 AM-304 days 3 days be in 50 through 80 mg/Sid.  #2 hypoglycemia post dinner 5 PM through 8 PM 3 out of 4 days excursions observed 2 days be in 50 through 70 mg/Sid and one day being less than 50 mg/Sid  #3 hyperglycemia prebreakfast 6 AM through 10 AM.  2 out of 4 days excursions observed 2 days being 150 through 250 mg/Sid.    Sensor information.  There are 802 readings in the study  Highest 227 mg/Sid  Lowest 43 mg/Sid-appears to be around 6:30 PM  Average 93 mg/Sid standard deviation 35 with MAD percentage 5.9%    Daily average with meal event  Before breakfast 134 mg/Sid  After breakfast 107 mg/Sid  Before lunch 127 mg/Sid  After lunch 106 mg/Sid  Before dinner 96 mg/Sid  After dinner 67 mg/Sid    Present therapy- 10-    New instructions for basal insulIn  Lantus U100 18 units in PM  Increase the PM dose 1 units every 1 days if fasting blood glucose is over 100 mg/dl     Novolog 1unit for every 25 above 120 mg/dl - this is done with each meal  10 units before breakfast, 10 units before lunch, 10 units before dinner      Future therapy    Shivani affect- needs pump therapy- 3 days 12am through 3am hypoglycemia    Completed paperwork for the 670G  Lantus to be changed to 16 units   NovoLog 1 unit for every 25 above 1 20-10 units with breakfast 10 units with lunch and 8 units with dinner

## 2017-12-05 NOTE — PROGRESS NOTES
The iPro Continuous Glucose Monitor is not implanted! A tiny glucose sensor at the end of the recorder will be inserted under the skin in a virtually painless process. Once it is inserted you will continue your daily routine as usual. Adverse reactions associated with glucose sensor insertion are highly uncommon and are limited to bleeding, irritation, pain, rash, infection, raised bump, and irritation at the site from the tape or bandage to secure the iPro CGM to the skin.    Glucose measurements are automatically collected and stored in the recorder. A total of 288 glucose readings- every 5 minutes throughout the day are recorded. Once your testing is complete, personalized reports will be generated allowing you and your doctor to fine tune your diabetes therapy. Remember, it does not display glucose values and is not intended to replace home glucose monitoring.    • Take blood glucose readings 4 times a day for the next 3 days  • Record these and daily events such as meals, insulin (if you take) and exercise in the Patient Logbook you are given.    o Test with your BG meter 1 hour after the start of your iPro study (and not before 1 hour) and record the exact time in your logbook.   o Test 4 times a day using your BG meter before breakfast, lunch, dinner and before bed and record the exact time in your logbook.     • Do not change any settings on your BG meter during the study and do not let anyone else use your meter during the study.    • Protect the iPro Recorder and glucose sensor site from accidental removal and refrain from contact sports or activities which may damage the monitor.     • If you are to have an x-ray, CT or MRI, the iPro needs to be removed prior to doing so.     • The iPro is waterproof, but should not be worn swimming for longer than 30 minutes at a depth of 8 feet. Hot baths should be avoided for longer than 10 minutes.     • Avoid wearing or being around magnets while wearing the iPro  (examples: cell phone messer, bracelet, belt, magnetic mattress pad).     • Do not administer insulin within 3 inches of the glucose sensor site.     • If tape peels back/becomes loose-do not remove or you could pull the sensor out! Place another piece of tape or band aid on top to secure.     • If the iPro accidentally falls off do not take apart any of the pieces…..keep the tape, sensor and recorder attached-in one piece-as it came off your body. Do not try to clean the iPro. Place this in a ziplock bag and bring back to the clinic with your logbook and glucose meter if one was provided to you  Please come back to your doctor’s office and have the iPro,  removed by the office staff.  Do not remove yourself.           Test your blood sugar levels 4 times per day:    • Before Breakfast  • Before Lunch  • Before Dinner  • Before Bed      Be sure to fill out your log sheets.     Test your blood sugar 15 minutes before your iPro being removed.     If you have any questions or concerns while wearing the device please call the office. [phone]

## 2018-01-08 DIAGNOSIS — I10 ESSENTIAL HYPERTENSION: ICD-10-CM

## 2018-01-08 RX ORDER — HYDROCHLOROTHIAZIDE 25 MG/1
TABLET ORAL
Qty: 90 TABLET | Refills: 0 | Status: SHIPPED | OUTPATIENT
Start: 2018-01-08 | End: 2018-05-04 | Stop reason: SDUPTHER

## 2018-01-18 DIAGNOSIS — E78.2 MIXED HYPERLIPIDEMIA: Primary | ICD-10-CM

## 2018-01-18 DIAGNOSIS — E55.9 AVITAMINOSIS D: ICD-10-CM

## 2018-01-18 DIAGNOSIS — E10.9 TYPE 1 DIABETES MELLITUS WITHOUT COMPLICATION (HCC): ICD-10-CM

## 2018-01-21 DIAGNOSIS — J30.2 SEASONAL ALLERGIC RHINITIS, UNSPECIFIED CHRONICITY, UNSPECIFIED TRIGGER: ICD-10-CM

## 2018-01-21 DIAGNOSIS — E78.2 MIXED HYPERLIPIDEMIA: ICD-10-CM

## 2018-01-21 DIAGNOSIS — I10 ESSENTIAL HYPERTENSION: ICD-10-CM

## 2018-01-22 RX ORDER — SIMVASTATIN 20 MG
TABLET ORAL
Qty: 90 TABLET | Refills: 0 | Status: SHIPPED | OUTPATIENT
Start: 2018-01-22 | End: 2018-05-03 | Stop reason: SDUPTHER

## 2018-01-22 RX ORDER — LISINOPRIL 10 MG/1
TABLET ORAL
Qty: 90 TABLET | Refills: 0 | Status: SHIPPED | OUTPATIENT
Start: 2018-01-22 | End: 2018-05-03 | Stop reason: SDUPTHER

## 2018-01-22 RX ORDER — CETIRIZINE HYDROCHLORIDE 10 MG/1
TABLET ORAL
Qty: 90 TABLET | Refills: 0 | Status: SHIPPED | OUTPATIENT
Start: 2018-01-22 | End: 2018-05-03 | Stop reason: SDUPTHER

## 2018-02-18 ENCOUNTER — RESULTS ENCOUNTER (OUTPATIENT)
Dept: ENDOCRINOLOGY | Age: 58
End: 2018-02-18

## 2018-02-18 DIAGNOSIS — E78.2 MIXED HYPERLIPIDEMIA: ICD-10-CM

## 2018-02-18 DIAGNOSIS — E10.9 TYPE 1 DIABETES MELLITUS WITHOUT COMPLICATION (HCC): ICD-10-CM

## 2018-02-18 DIAGNOSIS — E55.9 AVITAMINOSIS D: ICD-10-CM

## 2018-04-25 RX ORDER — INSULIN ASPART 100 [IU]/ML
INJECTION, SOLUTION INTRAVENOUS; SUBCUTANEOUS
Qty: 6 ML | Refills: 0 | Status: SHIPPED | OUTPATIENT
Start: 2018-04-25 | End: 2018-05-07 | Stop reason: SDUPTHER

## 2018-04-30 LAB
25(OH)D3+25(OH)D2 SERPL-MCNC: 38.9 NG/ML (ref 30–100)
ALBUMIN SERPL-MCNC: 4.5 G/DL (ref 3.5–5.2)
ALBUMIN/GLOB SERPL: 1.5 G/DL
ALP SERPL-CCNC: 71 U/L (ref 39–117)
ALT SERPL-CCNC: 28 U/L (ref 1–41)
AST SERPL-CCNC: 30 U/L (ref 1–40)
BILIRUB SERPL-MCNC: 0.6 MG/DL (ref 0.1–1.2)
BUN SERPL-MCNC: 12 MG/DL (ref 6–20)
BUN/CREAT SERPL: 11 (ref 7–25)
CALCIUM SERPL-MCNC: 9.5 MG/DL (ref 8.6–10.5)
CHLORIDE SERPL-SCNC: 97 MMOL/L (ref 98–107)
CHOLEST SERPL-MCNC: 145 MG/DL (ref 0–200)
CO2 SERPL-SCNC: 28.5 MMOL/L (ref 22–29)
CREAT SERPL-MCNC: 1.09 MG/DL (ref 0.76–1.27)
GFR SERPLBLD CREATININE-BSD FMLA CKD-EPI: 70 ML/MIN/1.73
GFR SERPLBLD CREATININE-BSD FMLA CKD-EPI: 85 ML/MIN/1.73
GLOBULIN SER CALC-MCNC: 3.1 GM/DL
GLUCOSE SERPL-MCNC: 207 MG/DL (ref 65–99)
HBA1C MFR BLD: 6.1 % (ref 4.8–5.6)
HDLC SERPL-MCNC: 62 MG/DL (ref 40–60)
INTERPRETATION: NORMAL
LDLC SERPL CALC-MCNC: 70 MG/DL (ref 0–100)
Lab: NORMAL
POTASSIUM SERPL-SCNC: 4.3 MMOL/L (ref 3.5–5.2)
PROT SERPL-MCNC: 7.6 G/DL (ref 6–8.5)
SODIUM SERPL-SCNC: 138 MMOL/L (ref 136–145)
TRIGL SERPL-MCNC: 63 MG/DL (ref 0–150)
VLDLC SERPL CALC-MCNC: 12.6 MG/DL (ref 5–40)

## 2018-04-30 RX ORDER — INSULIN ASPART 100 [IU]/ML
INJECTION, SOLUTION INTRAVENOUS; SUBCUTANEOUS
Qty: 30 ML | Refills: 0 | Status: SHIPPED | OUTPATIENT
Start: 2018-04-30 | End: 2018-08-22 | Stop reason: SDUPTHER

## 2018-04-30 RX ORDER — INSULIN GLARGINE 100 [IU]/ML
INJECTION, SOLUTION SUBCUTANEOUS
Qty: 15 ML | Refills: 0 | Status: SHIPPED | OUTPATIENT
Start: 2018-04-30 | End: 2018-08-22 | Stop reason: SDUPTHER

## 2018-05-03 DIAGNOSIS — E78.2 MIXED HYPERLIPIDEMIA: ICD-10-CM

## 2018-05-03 DIAGNOSIS — J30.2 SEASONAL ALLERGIC RHINITIS, UNSPECIFIED CHRONICITY, UNSPECIFIED TRIGGER: ICD-10-CM

## 2018-05-03 DIAGNOSIS — I10 ESSENTIAL HYPERTENSION: ICD-10-CM

## 2018-05-03 RX ORDER — CETIRIZINE HYDROCHLORIDE 10 MG/1
TABLET ORAL
Qty: 90 TABLET | Refills: 0 | Status: SHIPPED | OUTPATIENT
Start: 2018-05-03 | End: 2018-07-09 | Stop reason: SDUPTHER

## 2018-05-03 RX ORDER — LISINOPRIL 10 MG/1
TABLET ORAL
Qty: 90 TABLET | Refills: 0 | Status: SHIPPED | OUTPATIENT
Start: 2018-05-03 | End: 2018-07-09 | Stop reason: SDUPTHER

## 2018-05-03 RX ORDER — SIMVASTATIN 20 MG
TABLET ORAL
Qty: 90 TABLET | Refills: 0 | Status: SHIPPED | OUTPATIENT
Start: 2018-05-03 | End: 2018-07-09 | Stop reason: SDUPTHER

## 2018-05-04 DIAGNOSIS — I10 ESSENTIAL HYPERTENSION: ICD-10-CM

## 2018-05-04 RX ORDER — HYDROCHLOROTHIAZIDE 25 MG/1
25 TABLET ORAL DAILY
Qty: 90 TABLET | Refills: 0 | Status: SHIPPED | OUTPATIENT
Start: 2018-05-04 | End: 2018-08-23 | Stop reason: SDUPTHER

## 2018-05-07 ENCOUNTER — OFFICE VISIT (OUTPATIENT)
Dept: ENDOCRINOLOGY | Age: 58
End: 2018-05-07

## 2018-05-07 VITALS
HEIGHT: 68 IN | DIASTOLIC BLOOD PRESSURE: 84 MMHG | WEIGHT: 201 LBS | BODY MASS INDEX: 30.46 KG/M2 | SYSTOLIC BLOOD PRESSURE: 118 MMHG

## 2018-05-07 DIAGNOSIS — E10.9 TYPE 1 DIABETES MELLITUS WITHOUT COMPLICATION (HCC): Primary | ICD-10-CM

## 2018-05-07 DIAGNOSIS — I10 ESSENTIAL HYPERTENSION: ICD-10-CM

## 2018-05-07 DIAGNOSIS — E78.2 MIXED HYPERLIPIDEMIA: ICD-10-CM

## 2018-05-07 PROCEDURE — 99214 OFFICE O/P EST MOD 30 MIN: CPT | Performed by: NURSE PRACTITIONER

## 2018-05-07 NOTE — PATIENT INSTRUCTIONS
home blood tests -  Blood glucose testin times daily, that are: fasting- 1st thing in morning before eating or drinking, before each meal and 1 or 2 hours after meal  Bedtime, anytime you feel symptoms of hyperglycemia or hypoglycemia (high or low blood sugars)    New instructions for basal insulIn  Lantus U100 22 units in PM  Increase the PM dose 1 units every 1 days if fasting blood glucose is over 100 mg/dl     Novolog 1unit for every 30 above 120 mg/dl - this is done with each meal  2 units or every 15g before breakfast and lunch, 2 units for every 12 grams before dinner

## 2018-05-07 NOTE — PROGRESS NOTES
Fred Nagel  presents to the office  for the follow-up appointment for Type 2 diabetes mellitus.     The diabete's condition location is throughout the system with the  clinical course has fluctuated, the severity is Mild, and the modifying/allievating factors are insulin.  Medications and  Dosages were  reviewed with Fred Nagel and suggested that compliance most of the time.    The patient reports associated symptoms of hyperglycemia have been none and associated symptoms of hypoglycemia have been hunger and weakness, with their  hypoglycemia threshold for symptoms is 60 mg/dl .     The patient is currently on insulin.    Compliance with blood glucose monitoring: good.     Meal panning: The patient is using avoidance of concentrated sweets.    The patient is currently taking home blood tests - Blood glucose testing: 3-4 times daily, that are:  before each meal and 1 or 2 hours after meal  fasting and bedtime  anytime you feel symptoms of hyperglycemia or hypoglycemia (high or low blood sugars)   basal insulIn  Lantus U100 20 units in PM  Novolog U100  4 units before each meal - self adjusting counts normal 2 units for every 15g and 1u for 35mg/dl for 120mg/dl    Last instructions given were:  New instructions for basal insulIn  Lantus U100 18 units in PM  Increase the PM dose 1 units every 1 days if fasting blood glucose is over 100 mg/dl      Novolog 1unit for every 25 above 120 mg/dl - this is done with each meal  10 units before breakfast, 10 units before lunch, 10 units before dinner    Home blood glucose testing daily: 3-4  glucometer upload    Last reported blood glucose readings are:  Home blood glucose testing daily: 3-4  FB to 120 mg/dl - averages 120 mg/dl  before lunch 170 to 180 mg/dl  before dinner/supper 200 to 220 mg/dl  bedtime 105 to 130 mg/dl    Patterns reported per patient are none.         The following portions of the patient's history were reviewed and updated as appropriate:  current medications, past family history, past medical history, past social history, past surgical history and problem list.      Current Outpatient Prescriptions:   •  aspirin 81 MG EC tablet, Take 81 mg by mouth Daily., Disp: , Rfl:   •  cetirizine (zyrTEC) 10 MG tablet, TAKE 1 TABLET DAILY, Disp: 90 tablet, Rfl: 0  •  glucose blood (PRECISION XTRA TEST STRIPS) test strip, Test 4 times daily, Disp: 400 each, Rfl: 2  •  hydrochlorothiazide (HYDRODIURIL) 25 MG tablet, Take 1 tablet by mouth Daily. *PT MUST MAKE APPT FOR FUTURE REFILLS*, Disp: 90 tablet, Rfl: 0  •  insulin aspart (NOVOLOG FLEXPEN) 100 UNIT/ML solution pen-injector sc pen, Inject 4 Units under the skin 3 (Three) Times a Day With Meals., Disp: 18 mL, Rfl: 1  •  Insulin Glargine (LANTUS SOLOSTAR) 100 UNIT/ML injection pen, Inject 20 Units under the skin Every Night., Disp: 18 mL, Rfl: 1  •  Insulin Pen Needle 31G X 4 MM misc, 1 pen 4 (Four) Times a Day., Disp: 200 each, Rfl: 5  •  LANTUS SOLOSTAR 100 UNIT/ML injection pen, INJECT 20 UNITS UNDER THE SKIN EVERY NIGHT, Disp: 15 mL, Rfl: 0  •  lisinopril (PRINIVIL,ZESTRIL) 10 MG tablet, TAKE 1 TABLET DAILY, Disp: 90 tablet, Rfl: 0  •  Multiple Vitamins-Minerals (MULTIVITAMIN WITH MINERALS) tablet tablet, Take 1 tablet by mouth Daily., Disp: , Rfl:   •  NOVOLOG FLEXPEN 100 UNIT/ML solution pen-injector sc pen, INJECT 4 TO 8 UNITS UNDER THE SKIN THREE TIMES A DAY WITH MEALS AND USE SLIDING SCALE AS DIRECTED 1 UNIT FOR EVERY 35 ABOVE 150 MG/DL., Disp: 30 mL, Rfl: 0  •  simvastatin (ZOCOR) 20 MG tablet, TAKE 1 TABLET EVERY NIGHT, Disp: 90 tablet, Rfl: 0    Patient Active Problem List    Diagnosis   • Mixed hyperlipidemia [E78.2]   • Seasonal allergies [J30.2]   • Type 1 diabetes mellitus without complication [E10.9]     Overview Note:     Managed by endocrinology     • Hyperlipidemia [E78.5]   • Essential hypertension [I10]   • Benign brain tumor [D33.2]       Review of Systems   A comprehensive review of the  "14 systems was negative except of listed below:  Endocrine: hyperglycemia     Objective:     Wt Readings from Last 3 Encounters:   05/07/18 91.2 kg (201 lb)   11/02/17 89.2 kg (196 lb 9.6 oz)   10/19/17 90.3 kg (199 lb)     Temp Readings from Last 3 Encounters:   11/02/17 97.8 °F (36.6 °C)   08/03/17 97 °F (36.1 °C)     BP Readings from Last 3 Encounters:   05/07/18 118/84   11/02/17 122/80   10/19/17 118/80     Pulse Readings from Last 3 Encounters:   11/02/17 78   08/03/17 72        /84   Ht 172.7 cm (67.99\")   Wt 91.2 kg (201 lb)   BMI 30.57 kg/m²     General appearance:  alert, cooperative, delirious, fatigued, nourished\" \"appears stated age and cooperative\" \"NAD   Neck: no carotid bruit, supple, symmetrical, trachea midline and thyroid not enlarged, symmetric, no tenderness/mass/nodules   Thyroid:  no palpable nodule, no enlargement, no tenderness   Lung:  \"clear to auscultation bilaterally\" \"no abnormal breath sounds  \" effort was normal, no labored breath, no use of accessory muscles.   Heart: regular rate and rhythm, S1, S2 normal, no murmur, click, rub or gallop      Abdomen:  normal bowel sounds- 4 quads, soft non-tender and contour - slt rounded, reinaldo lower quads - appr 4x4 cm round firm cystic/nodule formation secondary to insulin inj      Extremities: extremities normal, atraumatic, no cyanosis or edema extremities normal, atraumatic, no cyanosis or edema\" WNL - gait and station, strength and stability\"       Skin:   Pulses:  warm and dry, no hyperpigmentation, normal skin coloring, or suspicious lesions   2+ and symmetric   Neuro: Alert and oriented x3. Gait normal. Reflexes and motor strength normal and symmetric. Cranial nerves 2-12 and sensation grossly intact.      Psych: behavior - normal, judgement - normal, mood - normal, affect - normal                 Lab Review  Results for orders placed or performed in visit on 02/18/18   Comprehensive Metabolic Panel   Result Value Ref Range    " Glucose 207 (H) 65 - 99 mg/dL    BUN 12 6 - 20 mg/dL    Creatinine 1.09 0.76 - 1.27 mg/dL    eGFR Non African Am 70 >60 mL/min/1.73    eGFR African Am 85 >60 mL/min/1.73    BUN/Creatinine Ratio 11.0 7.0 - 25.0    Sodium 138 136 - 145 mmol/L    Potassium 4.3 3.5 - 5.2 mmol/L    Chloride 97 (L) 98 - 107 mmol/L    Total CO2 28.5 22.0 - 29.0 mmol/L    Calcium 9.5 8.6 - 10.5 mg/dL    Total Protein 7.6 6.0 - 8.5 g/dL    Albumin 4.50 3.50 - 5.20 g/dL    Globulin 3.1 gm/dL    A/G Ratio 1.5 g/dL    Total Bilirubin 0.6 0.1 - 1.2 mg/dL    Alkaline Phosphatase 71 39 - 117 U/L    AST (SGOT) 30 1 - 40 U/L    ALT (SGPT) 28 1 - 41 U/L   Hemoglobin A1c   Result Value Ref Range    Hemoglobin A1C 6.10 (H) 4.80 - 5.60 %   Vitamin D 25 Hydroxy   Result Value Ref Range    25 Hydroxy, Vitamin D 38.9 30.0 - 100.0 ng/ml   Lipid Panel   Result Value Ref Range    Total Cholesterol 145 0 - 200 mg/dL    Triglycerides 63 0 - 150 mg/dL    HDL Cholesterol 62 (H) 40 - 60 mg/dL    VLDL Cholesterol 12.6 5 - 40 mg/dL    LDL Cholesterol  70 0 - 100 mg/dL   Cardiovascular Risk Assessment   Result Value Ref Range    Interpretation Note    Diabetes Patient Education   Result Value Ref Range    PDF Image Not applicable            Assessment:   Fred was seen today for follow-up.    Diagnoses and all orders for this visit:    Type 1 diabetes mellitus without complication  -     Fructosamine; Future  -     Comprehensive Metabolic Panel; Future  -     Hemoglobin A1c; Future  -     Insulin, Total; Future  -     Lipid Panel; Future  -     Microalbumin / Creatinine Urine Ratio - Urine, Clean Catch; Future  -     Uric Acid; Future  -     Vitamin D 25 Hydroxy; Future  -     TSH; Future  -     Thyroid Antibodies; Future  -     T4, Free; Future  -     T3, Free; Future  -     Insulin Glargine (LANTUS SOLOSTAR) 100 UNIT/ML injection pen; Inject 20 Units under the skin Every Night.  -     insulin aspart (NOVOLOG FLEXPEN) 100 UNIT/ML solution pen-injector sc pen;  Inject 4 Units under the skin 3 (Three) Times a Day With Meals.    Mixed hyperlipidemia  -     Comprehensive Metabolic Panel; Future  -     Lipid Panel; Future    Essential hypertension  -     Comprehensive Metabolic Panel; Future          Plan:   In summary/ Medication changes:     home blood tests -  Blood glucose testin times daily, that are: fasting- 1st thing in morning before eating or drinking, before each meal and 1 or 2 hours after meal  Bedtime, anytime you feel symptoms of hyperglycemia or hypoglycemia (high or low blood sugars)    New instructions for basal insulIn  Lantus U100 22 units in PM  Increase the PM dose 1 units every 1 days if fasting blood glucose is over 100 mg/dl     Novolog 1unit for every 30 above 120 mg/dl - this is done with each meal  2 units or every 15g before breakfast and lunch, 2 units for every 12 grams before dinner      Education:  Blood glucose testing: 3-4 times daily, that are:  fasting- 1st thing in morning before eating or drinking  before each meal and 1 or 2 hours after meal  bedtime  anytime you feel symptoms of hyperglycemia or hypoglycemia (high or low blood sugars)        The total face to face time spent was  25 minutes  with additional education given: 14 minutes (greater than 50% of the total time) was spent with counseling and coordination of care on: SMBG with goals, Side effects profiles with medications, medication use and purposes,    Return in about 3 months (around 2018), or if symptoms worsen or fail to improve, for Recheck. 3 month with Hlida- 2 weeks for labs, 6 months with Dr. King - 2 weeks for labs        Dragon transcription disclaimer     Much of this encounter note is an electronic transcription/translation of spoken language to printed text. The electronic translation of spoken language may permit erroneous, or at times, nonsensical words or phrases to be inadvertently transcribed. Although I have reviewed the note for such errors, some  may still exist.

## 2018-05-17 RX ORDER — LANCETS
EACH MISCELLANEOUS
Qty: 800 EACH | Refills: 1 | Status: SHIPPED | OUTPATIENT
Start: 2018-05-17 | End: 2019-05-17

## 2018-05-17 NOTE — TELEPHONE ENCOUNTER
Spoke with patient about what brand of test strips and lancets he needed sent to the pharmacy.     [facility] accuchek nati [facility] of patient.

## 2018-06-29 ENCOUNTER — HOSPITAL ENCOUNTER (EMERGENCY)
Facility: HOSPITAL | Age: 58
Discharge: HOME | End: 2018-06-29
Attending: EMERGENCY MEDICINE | Admitting: EMERGENCY MEDICINE

## 2018-06-29 VITALS
RESPIRATION RATE: 18 BRPM | SYSTOLIC BLOOD PRESSURE: 140 MMHG | DIASTOLIC BLOOD PRESSURE: 91 MMHG | OXYGEN SATURATION: 96 % | TEMPERATURE: 97.6 F | WEIGHT: 185 LBS | HEIGHT: 66 IN | HEART RATE: 82 BPM | BODY MASS INDEX: 29.73 KG/M2

## 2018-06-29 DIAGNOSIS — E10.649 HYPOGLYCEMIA DUE TO TYPE 1 DIABETES MELLITUS (HCC): Primary | ICD-10-CM

## 2018-06-29 LAB
ALBUMIN SERPL-MCNC: 4.5 G/DL (ref 3.5–5.2)
ALBUMIN/GLOB SERPL: 1.4 G/DL
ALP SERPL-CCNC: 64 U/L (ref 39–117)
ALT SERPL W P-5'-P-CCNC: 22 U/L (ref 1–41)
ANION GAP SERPL CALCULATED.3IONS-SCNC: 11.6 MMOL/L
AST SERPL-CCNC: 21 U/L (ref 1–40)
BASOPHILS # BLD AUTO: 0.01 10*3/MM3 (ref 0–0.2)
BASOPHILS NFR BLD AUTO: 0.1 % (ref 0–1.5)
BILIRUB SERPL-MCNC: 0.6 MG/DL (ref 0.1–1.2)
BUN BLD-MCNC: 15 MG/DL (ref 6–20)
BUN/CREAT SERPL: 15 (ref 7–25)
CALCIUM SPEC-SCNC: 9.2 MG/DL (ref 8.6–10.5)
CHLORIDE SERPL-SCNC: 99 MMOL/L (ref 98–107)
CO2 SERPL-SCNC: 27.4 MMOL/L (ref 22–29)
CREAT BLD-MCNC: 1 MG/DL (ref 0.76–1.27)
DEPRECATED RDW RBC AUTO: 40.6 FL (ref 37–54)
EOSINOPHIL # BLD AUTO: 0.05 10*3/MM3 (ref 0–0.7)
EOSINOPHIL NFR BLD AUTO: 0.6 % (ref 0.3–6.2)
ERYTHROCYTE [DISTWIDTH] IN BLOOD BY AUTOMATED COUNT: 13.8 % (ref 11.5–14.5)
GFR SERPL CREATININE-BSD FRML MDRD: 94 ML/MIN/1.73
GLOBULIN UR ELPH-MCNC: 3.2 GM/DL
GLUCOSE BLD-MCNC: 107 MG/DL (ref 65–99)
GLUCOSE BLDC GLUCOMTR-MCNC: 106 MG/DL (ref 70–130)
GLUCOSE BLDC GLUCOMTR-MCNC: 111 MG/DL (ref 70–130)
GLUCOSE BLDC GLUCOMTR-MCNC: 128 MG/DL (ref 70–130)
GLUCOSE BLDC GLUCOMTR-MCNC: 98 MG/DL (ref 70–130)
HCT VFR BLD AUTO: 39.9 % (ref 40.4–52.2)
HGB BLD-MCNC: 13.1 G/DL (ref 13.7–17.6)
IMM GRANULOCYTES # BLD: 0 10*3/MM3 (ref 0–0.03)
IMM GRANULOCYTES NFR BLD: 0 % (ref 0–0.5)
LYMPHOCYTES # BLD AUTO: 0.64 10*3/MM3 (ref 0.9–4.8)
LYMPHOCYTES NFR BLD AUTO: 8.1 % (ref 19.6–45.3)
MCH RBC QN AUTO: 26.5 PG (ref 27–32.7)
MCHC RBC AUTO-ENTMCNC: 32.8 G/DL (ref 32.6–36.4)
MCV RBC AUTO: 80.8 FL (ref 79.8–96.2)
MONOCYTES # BLD AUTO: 0.43 10*3/MM3 (ref 0.2–1.2)
MONOCYTES NFR BLD AUTO: 5.4 % (ref 5–12)
NEUTROPHILS # BLD AUTO: 6.81 10*3/MM3 (ref 1.9–8.1)
NEUTROPHILS NFR BLD AUTO: 85.8 % (ref 42.7–76)
PLATELET # BLD AUTO: 237 10*3/MM3 (ref 140–500)
PMV BLD AUTO: 9.5 FL (ref 6–12)
POTASSIUM BLD-SCNC: 3 MMOL/L (ref 3.5–5.2)
PROT SERPL-MCNC: 7.7 G/DL (ref 6–8.5)
RBC # BLD AUTO: 4.94 10*6/MM3 (ref 4.6–6)
SODIUM BLD-SCNC: 138 MMOL/L (ref 136–145)
WBC NRBC COR # BLD: 7.94 10*3/MM3 (ref 4.5–10.7)

## 2018-06-29 PROCEDURE — 99284 EMERGENCY DEPT VISIT MOD MDM: CPT

## 2018-06-29 PROCEDURE — 82962 GLUCOSE BLOOD TEST: CPT

## 2018-06-29 PROCEDURE — 85025 COMPLETE CBC W/AUTO DIFF WBC: CPT | Performed by: EMERGENCY MEDICINE

## 2018-06-29 PROCEDURE — 80053 COMPREHEN METABOLIC PANEL: CPT | Performed by: EMERGENCY MEDICINE

## 2018-06-29 RX ORDER — SODIUM CHLORIDE 0.9 % (FLUSH) 0.9 %
10 SYRINGE (ML) INJECTION AS NEEDED
Status: DISCONTINUED | OUTPATIENT
Start: 2018-06-29 | End: 2018-06-29 | Stop reason: HOSPADM

## 2018-07-03 ENCOUNTER — TELEPHONE (OUTPATIENT)
Dept: SOCIAL WORK | Facility: HOSPITAL | Age: 58
End: 2018-07-03

## 2018-07-03 ENCOUNTER — OFFICE VISIT (OUTPATIENT)
Dept: INTERNAL MEDICINE | Age: 58
End: 2018-07-03

## 2018-07-03 VITALS
TEMPERATURE: 97.9 F | OXYGEN SATURATION: 98 % | WEIGHT: 196.2 LBS | HEIGHT: 68 IN | BODY MASS INDEX: 29.73 KG/M2 | SYSTOLIC BLOOD PRESSURE: 122 MMHG | HEART RATE: 71 BPM | DIASTOLIC BLOOD PRESSURE: 82 MMHG

## 2018-07-03 DIAGNOSIS — E10.9 TYPE 1 DIABETES MELLITUS WITHOUT COMPLICATION (HCC): ICD-10-CM

## 2018-07-03 DIAGNOSIS — E10.649 HYPOGLYCEMIC INSULIN REACTION IN TYPE 1 DIABETES MELLITUS (HCC): Primary | ICD-10-CM

## 2018-07-03 DIAGNOSIS — E87.6 HYPOKALEMIA: ICD-10-CM

## 2018-07-03 DIAGNOSIS — I10 ESSENTIAL HYPERTENSION: ICD-10-CM

## 2018-07-03 LAB
BUN SERPL-MCNC: 13 MG/DL (ref 6–20)
BUN/CREAT SERPL: 11.5 (ref 7–25)
CALCIUM SERPL-MCNC: 9.5 MG/DL (ref 8.6–10.5)
CHLORIDE SERPL-SCNC: 101 MMOL/L (ref 98–107)
CO2 SERPL-SCNC: 29.4 MMOL/L (ref 22–29)
CREAT SERPL-MCNC: 1.13 MG/DL (ref 0.76–1.27)
GLUCOSE SERPL-MCNC: 171 MG/DL (ref 65–99)
POTASSIUM SERPL-SCNC: 4.7 MMOL/L (ref 3.5–5.2)
SODIUM SERPL-SCNC: 141 MMOL/L (ref 136–145)

## 2018-07-03 PROCEDURE — 99214 OFFICE O/P EST MOD 30 MIN: CPT | Performed by: NURSE PRACTITIONER

## 2018-07-03 NOTE — PROGRESS NOTES
"[facility] INTERNAL MEDICINE      Fred Nagel / 57 y.o. / male  07/03/2018      MEDICATIONS  Current Outpatient Prescriptions   Medication Sig Dispense Refill   • ACCU-CHEK SOFTCLIX LANCETS lancets Use to test 8 times daily 800 each 1   • aspirin 81 MG EC tablet Take 81 mg by mouth Daily.     • cetirizine (zyrTEC) 10 MG tablet TAKE 1 TABLET DAILY 90 tablet 0   • glucose blood (ACCU-CHEK HAI) test strip Use to test 8 times daily 800 each 1   • hydrochlorothiazide (HYDRODIURIL) 25 MG tablet Take 1 tablet by mouth Daily. *PT MUST MAKE APPT FOR FUTURE REFILLS* 90 tablet 0   • insulin aspart (NOVOLOG FLEXPEN) 100 UNIT/ML solution pen-injector sc pen Inject 4 Units under the skin 3 (Three) Times a Day With Meals. 18 mL 1   • Insulin Pen Needle 31G X 4 MM misc 1 pen 4 (Four) Times a Day. 400 each 2   • LANTUS SOLOSTAR 100 UNIT/ML injection pen INJECT 20 UNITS UNDER THE SKIN EVERY NIGHT 15 mL 0   • lisinopril (PRINIVIL,ZESTRIL) 10 MG tablet TAKE 1 TABLET DAILY 90 tablet 0   • Multiple Vitamins-Minerals (MULTIVITAMIN WITH MINERALS) tablet tablet Take 1 tablet by mouth Daily.     • NOVOLOG FLEXPEN 100 UNIT/ML solution pen-injector sc pen INJECT 4 TO 8 UNITS UNDER THE SKIN THREE TIMES A DAY WITH MEALS AND USE SLIDING SCALE AS DIRECTED 1 UNIT FOR EVERY 35 ABOVE 150 MG/DL. 30 mL 0   • simvastatin (ZOCOR) 20 MG tablet TAKE 1 TABLET EVERY NIGHT 90 tablet 0   • glucagon (GLUCAGON EMERGENCY) 1 MG injection Inject 1 mg under the skin 1 (One) Time As Needed for Low Blood Sugar for up to 1 dose. 2 kit 2     No current facility-administered medications for this visit.          VITALS    Visit Vitals  /82   Pulse 71   Temp 97.9 °F (36.6 °C)   Ht 172.7 cm (67.99\")   Wt 89 kg (196 lb 3.2 oz)   SpO2 98%   BMI 29.84 kg/m²       BP Readings from Last 3 Encounters:   07/03/18 122/82   06/29/18 140/91   05/07/18 118/84     Wt Readings from Last 3 Encounters:   07/03/18 89 kg (196 lb 3.2 oz)   06/29/18 83.9 kg (185 lb)   05/07/18 91.2 kg " "(201 lb)      Body mass index is 29.84 kg/m².    CC:  Main reason(s) for today's visit: Hypoglycemia (ER f/u 6/29/2018 hypoglycemic episode; pt reports taking insulin twice before dinner); Diabetes; and ER Follow Up      HPI:     Date of emergency department encounter: 6/29/18  Emergency department: [facility]  Principle Dx: Hypoglycemia due to type I diabetes mellitus   Secondary Dx:   History prior to ED visit: Patient reports on the evening of 6/28 that he gave himself his 4 units of fast acting insulin with dinner, but did not feel as if it was completely absorbed as he felt a \"knot\" on his abdomen. He then gave himself an additional 4 units before going to bed. He was found non-responsive by his wife and then he stood up, fell, and hit his nose on the dresser.  EMS was called, and he was found to have a blood sugar of 32.  He was given glucagon at that time, and transported to the ER for further evaluation. Evaluation/Treatment:  He was monitored for a short amount of time in the ER and blood sugars were stable before he was released home. No imaging was performed.   Course: Patient feels great today, no further issues. He has never had any hypoglycemic episodes in the past, and his DM is managed by Endo. Denies any headache, blurry vision, vomiting. Nose is slightly tender, but swelling resolved and no abnormality. I have reviewed ER visit and labs and noted patient to have low potassium in the ER. Denies any palpitations, cramps, weakness.     Patient Care Team:  MARCELLA Prasad as PCP - General (Internal Medicine)  ____________________________________________________________________    ASSESSMENT & PLAN:    1. Hypoglycemic insulin reaction in type 1 diabetes mellitus (CMS/HCC)  Single episode of self-induced hypoglycemia from taking too much fast acting insulin. Patient does not have any history of hypoglycemia prior to this point. Will provide patient with glucagon kits for home use " PRN. Follow up with endo as scheduled next month.     - glucagon (GLUCAGON EMERGENCY) 1 MG injection; Inject 1 mg under the skin 1 (One) Time As Needed for Low Blood Sugar for up to 1 dose.  Dispense: 2 kit; Refill: 2    2. Hypokalemia  Had low K in ER, likely related to insulin excess. Will recheck today to ensure resolved, especially since on diuretic as well.     - Basic metabolic panel    3. Essential hypertension  Controlled, continue same. Follow up in 6 months.     - Basic metabolic panel    4. Type 1 diabetes mellitus without complication (CMS/HCC)  - glucagon (GLUCAGON EMERGENCY) 1 MG injection; Inject 1 mg under the skin 1 (One) Time As Needed for Low Blood Sugar for up to 1 dose.  Dispense: 2 kit; Refill: 2    Orders Placed This Encounter   Procedures   • Basic metabolic panel     New Medications Ordered This Visit   Medications   • glucagon (GLUCAGON EMERGENCY) 1 MG injection     Sig: Inject 1 mg under the skin 1 (One) Time As Needed for Low Blood Sugar for up to 1 dose.     Dispense:  2 kit     Refill:  2       Summary/Discussion:    Current outpatient and discharge medications have been reconciled for the patient.  Reviewed by: MARCELLA Prasad        Return in about 6 months (around 1/3/2019) for Next scheduled follow up.    Future Appointments  Date Time Provider Department Center   8/8/2018 9:30 AM [facility] None   8/22/2018 11:00 AM MARCELLA Verma [facility] None   12/19/2018 9:30 AM [facility] None   1/2/2019 10:20 AM Liv King MD [facility] None   1/4/2019 10:00 AM MARCELLA [facility]asadK [facility] None     ____________________________________________________________________    REVIEW OF SYSTEMS    Review of Systems   Constitutional: Negative for chills, fatigue and fever.   Eyes: Negative for visual disturbance.   Gastrointestinal: Negative for nausea and vomiting.   Endocrine: Negative for polydipsia, polyphagia and polyuria.    Neurological: Negative for dizziness, syncope, facial asymmetry, weakness, light-headedness, numbness and headaches.         PHYSICAL EXAMINATION    Physical Exam   Constitutional: He is oriented to person, place, and time. Vital signs are normal. He appears well-developed and well-nourished. He is cooperative. He does not appear ill. No distress.   HENT:   Head: Head is with abrasion.       Cardiovascular: Normal rate, regular rhythm, S1 normal, S2 normal and normal heart sounds.    No murmur heard.  Pulmonary/Chest: Effort normal and breath sounds normal. He has no decreased breath sounds. He has no wheezes. He has no rhonchi. He has no rales.   Neurological: He is alert and oriented to person, place, and time.   Skin: Skin is warm, dry and intact.   Psychiatric: He has a normal mood and affect. His speech is normal and behavior is normal. Judgment and thought content normal. Cognition and memory are normal.   Nursing note and vitals reviewed.        REVIEWED DATA:    Labs:   Admission on 06/29/2018, Discharged on 06/29/2018   Component Date Value Ref Range Status   • Glucose 06/29/2018 107* 65 - 99 mg/dL Final   • BUN 06/29/2018 15  6 - 20 mg/dL Final   • Creatinine 06/29/2018 1.00  0.76 - 1.27 mg/dL Final   • Sodium 06/29/2018 138  136 - 145 mmol/L Final   • Potassium 06/29/2018 3.0* 3.5 - 5.2 mmol/L Final   • Chloride 06/29/2018 99  98 - 107 mmol/L Final   • CO2 06/29/2018 27.4  22.0 - 29.0 mmol/L Final   • Calcium 06/29/2018 9.2  8.6 - 10.5 mg/dL Final   • Total Protein 06/29/2018 7.7  6.0 - 8.5 g/dL Final   • Albumin 06/29/2018 4.50  3.50 - 5.20 g/dL Final   • ALT (SGPT) 06/29/2018 22  1 - 41 U/L Final   • AST (SGOT) 06/29/2018 21  1 - 40 U/L Final   • Alkaline Phosphatase 06/29/2018 64  39 - 117 U/L Final   • Total Bilirubin 06/29/2018 0.6  0.1 - 1.2 mg/dL Final   • eGFR   Amer 06/29/2018 94  >60 mL/min/1.73 Final   • Globulin 06/29/2018 3.2  gm/dL Final   • A/G Ratio 06/29/2018 1.4  g/dL Final   •  BUN/Creatinine Ratio 06/29/2018 15.0  7.0 - 25.0 Final   • Anion Gap 06/29/2018 11.6  mmol/L Final   • WBC 06/29/2018 7.94  4.50 - 10.70 10*3/mm3 Final   • RBC 06/29/2018 4.94  4.60 - 6.00 10*6/mm3 Final   • Hemoglobin 06/29/2018 13.1* 13.7 - 17.6 g/dL Final   • Hematocrit 06/29/2018 39.9* 40.4 - 52.2 % Final   • MCV 06/29/2018 80.8  79.8 - 96.2 fL Final   • MCH 06/29/2018 26.5* 27.0 - 32.7 pg Final   • MCHC 06/29/2018 32.8  32.6 - 36.4 g/dL Final   • RDW 06/29/2018 13.8  11.5 - 14.5 % Final   • RDW-SD 06/29/2018 40.6  37.0 - 54.0 fl Final   • MPV 06/29/2018 9.5  6.0 - 12.0 fL Final   • Platelets 06/29/2018 237  140 - 500 10*3/mm3 Final   • Neutrophil % 06/29/2018 85.8* 42.7 - 76.0 % Final   • Lymphocyte % 06/29/2018 8.1* 19.6 - 45.3 % Final   • Monocyte % 06/29/2018 5.4  5.0 - 12.0 % Final   • Eosinophil % 06/29/2018 0.6  0.3 - 6.2 % Final   • Basophil % 06/29/2018 0.1  0.0 - 1.5 % Final   • Immature Grans % 06/29/2018 0.0  0.0 - 0.5 % Final   • Neutrophils, Absolute 06/29/2018 6.81  1.90 - 8.10 10*3/mm3 Final   • Lymphocytes, Absolute 06/29/2018 0.64* 0.90 - 4.80 10*3/mm3 Final   • Monocytes, Absolute 06/29/2018 0.43  0.20 - 1.20 10*3/mm3 Final   • Eosinophils, Absolute 06/29/2018 0.05  0.00 - 0.70 10*3/mm3 Final   • Basophils, Absolute 06/29/2018 0.01  0.00 - 0.20 10*3/mm3 Final   • Immature Grans, Absolute 06/29/2018 0.00  0.00 - 0.03 10*3/mm3 Final   • Glucose 06/29/2018 128  70 - 130 mg/dL Final   • Glucose 06/29/2018 106  70 - 130 mg/dL Final   • Glucose 06/29/2018 98  70 - 130 mg/dL Final   • Glucose 06/29/2018 111  70 - 130 mg/dL Final         Imaging:   No results found.       Medical Tests:         Summary of old records / correspondence / consultant report:   ED encounter note re: issues addressed on HPI    Request outside records:       ALLERGIES  No Known Allergies     PFSH:     The following portions of the patient's history were reviewed and updated as appropriate: Allergies / Current Medications /  Past Medical History / Surgical History / Social History / Family History    PROBLEM LIST   Patient Active Problem List   Diagnosis   • Type 1 diabetes mellitus without complication (CMS/HCC)   • Hyperlipidemia   • Essential hypertension   • Benign brain tumor (CMS/HCC)   • Seasonal allergies   • Mixed hyperlipidemia       PAST MEDICAL HISTORY  Past Medical History:   Diagnosis Date   • Allergic    • Benign brain tumor (CMS/HCC)    • Diabetes mellitus (CMS/HCC) 2012    Type 1 Diabetes   • DKA, type 1 (CMS/HCC)    • Hyperlipidemia    • Hypertension        SURGICAL HISTORY  Past Surgical History:   Procedure Laterality Date   • ACHILLES TENDON REPAIR         SOCIAL HISTORY  Social History     Social History   • Marital status:      Occupational History   • retired        Social History Main Topics   • Smoking status: Former Smoker     Years: 20.00     Quit date: 1996   • Smokeless tobacco: Never Used   • Alcohol use No      Comment: quit 1989   • Drug use: No   • Sexual activity: Yes     Partners: Female      Comment:  (wife)      Other Topics Concern   • Not on file       FAMILY HISTORY  Family History   Problem Relation Age of Onset   • Cancer Mother    • Cancer Father    • Hypertension Sister    • Hypertension Brother          **Diego Disclaimer:   Much of this encounter note is an electronic transcription/translation of spoken language to printed text. The electronic translation of spoken language may permit erroneous, or at times, nonsensical words or phrases to be inadvertently transcribed. Although I have reviewed the note for such errors, some may still exist.

## 2018-07-03 NOTE — PATIENT INSTRUCTIONS
Hypoglycemia    Hypoglycemia occurs when the level of sugar (glucose) in the blood is too low. Glucose is a type of sugar that provides the body's main source of energy. Certain hormones (insulin and glucagon) control the level of glucose in the blood. Insulin lowers blood glucose, and glucagon increases blood glucose. Hypoglycemia can result from having too much insulin in the bloodstream, or from not eating enough food that contains glucose.  Hypoglycemia can happen in people who do or do not have diabetes. It can develop quickly, and it can be a medical emergency.  What are the causes?  Hypoglycemia occurs most often in people who have diabetes. If you have diabetes, hypoglycemia may be caused by:  · Diabetes medicine.  · Not eating enough, or not eating often enough.  · Increased physical activity.  · Drinking alcohol, especially when you have not eaten recently.    If you do not have diabetes, hypoglycemia may be caused by:  · A tumor in the pancreas. The pancreas is the organ that makes insulin.  · Not eating enough, or not eating for long periods at a time (fasting).  · Severe infection or illness that affects the liver, heart, or kidneys.  · Certain medicines.    You may also have reactive hypoglycemia. This condition causes hypoglycemia within 4 hours of eating a meal. This may occur after having stomach surgery. Sometimes, the cause of reactive hypoglycemia is not known.  What increases the risk?  Hypoglycemia is more likely to develop in:  · People who have diabetes and take medicines to lower blood glucose.  · People who abuse alcohol.  · People who have a severe illness.    What are the signs or symptoms?  Hypoglycemia may not cause any symptoms. If you have symptoms, they may include:  · Hunger.  · Anxiety.  · Sweating and feeling clammy.  · Confusion.  · Dizziness or feeling light-headed.  · Sleepiness.  · Nausea.  · Increased heart rate.  · Headache.  · Blurry  vision.  · Seizure.  · Nightmares.  · Tingling or numbness around the mouth, lips, or tongue.  · A change in speech.  · Decreased ability to concentrate.  · A change in coordination.  · Restless sleep.  · Tremors or shakes.  · Fainting.  · Irritability.    How is this diagnosed?  Hypoglycemia is diagnosed with a blood test to measure your blood glucose level. This blood test is done while you are having symptoms. Your health care provider may also do a physical exam and review your medical history.  If you do not have diabetes, other tests may be done to find the cause of your hypoglycemia.  How is this treated?  This condition can often be treated by immediately eating or drinking something that contains glucose, such as:  · 3-4 sugar tablets (glucose pills).  · Glucose gel, 15-gram tube.  · Fruit juice, 4 oz (120 mL).  · Regular soda (not diet soda), 4 oz (120 mL).  · Low-fat milk, 4 oz (120 mL).  · Several pieces of hard candy.  · Sugar or honey, 1 Tbsp.    Treating Hypoglycemia If You Have Diabetes    If you are alert and able to swallow safely, follow the 15:15 rule:  · Take 15 grams of a rapid-acting carbohydrate. Rapid-acting options include:  ? 1 tube of glucose gel.  ? 3 glucose pills.  ? 6-8 pieces of hard candy.  ? 4 oz (120 mL) of fruit juice.  ? 4 oz (120 ml) of regular (not diet) soda.  · Check your blood glucose 15 minutes after you take the carbohydrate.  · If the repeat blood glucose level is still at or below 70 mg/dL (3.9 mmol/L), take 15 grams of a carbohydrate again.  · If your blood glucose level does not increase above 70 mg/dL (3.9 mmol/L) after 3 tries, seek emergency medical care.  · After your blood glucose level returns to normal, eat a meal or a snack within 1 hour.    Treating Severe Hypoglycemia  Severe hypoglycemia is when your blood glucose level is at or below 54 mg/dL (3 mmol/L). Severe hypoglycemia is an emergency. Do not wait to see if the symptoms will go away. Get medical help  right away. Call your local emergency services (911 in the [place] Do not drive yourself to the hospital.  If you have severe hypoglycemia and you cannot eat or drink, you may need an injection of glucagon. A family member or close friend should learn how to check your blood glucose and how to give you a glucagon injection. Ask your health care provider if you need to have an emergency glucagon injection kit available.  Severe hypoglycemia may need to be treated in a hospital. The treatment may include getting glucose through an IV tube. You may also need treatment for the cause of your hypoglycemia.  Follow these instructions at home:  General instructions  · Avoid any diets that cause you to not eat enough food. Talk with your health care provider before you start any new diet.  · Take over-the-counter and prescription medicines only as told by your health care provider.  · Limit alcohol intake to no more than 1 drink per day for nonpregnant women and 2 drinks per day for men. One drink equals 12 oz of beer, 5 oz of wine, or 1½ oz of hard liquor.  · Keep all follow-up visits as told by your health care provider. This is important.  If You Have Diabetes:    · Make sure you know the symptoms of hypoglycemia.  · Always have a rapid-acting carbohydrate snack with you to treat low blood sugar.  · Follow your diabetes management plan, as told by your health care provider. Make sure you:  ? Take your medicines as directed.  ? Follow your exercise plan.  ? Follow your meal plan. Eat on time, and do not skip meals.  ? Check your blood glucose as often as directed. Make sure to check your blood glucose before and after exercise. If you exercise longer or in a different way than usual, check your blood glucose more often.  ? Follow your sick day plan whenever you cannot eat or drink normally. Make this plan in advance with your health care provider.  · Share your diabetes management plan with people in your workplace, school,  and household.  · Check your urine for ketones when you are ill and as told by your health care provider.  · Carry a medical alert card or wear medical alert jewelry.  If You Have Reactive Hypoglycemia or Low Blood Sugar From Other Causes:  · Monitor your blood glucose as told by your health care provider.  · Follow instructions from your health care provider about eating or drinking restrictions.  Contact a health care provider if:  · You have problems keeping your blood glucose in your target range.  · You have frequent episodes of hypoglycemia.  Get help right away if:  · You continue to have hypoglycemia symptoms after eating or drinking something containing glucose.  · Your blood glucose is at or below 54 mg/dL (3 mmol/L).  · You have a seizure.  · You faint.  These symptoms may represent a serious problem that is an emergency. Do not wait to see if the symptoms will go away. Get medical help right away. Call your local emergency services (911 in the [place] Do not drive yourself to the hospital.  This information is not intended to replace advice given to you by your health care provider. Make sure you discuss any questions you have with your health care provider.  Document Released: 12/18/2006 Document Revised: 05/31/2017 Document Reviewed: 01/20/2017  [employer] Interactive Patient Education © 2018 [employer]

## 2018-07-09 DIAGNOSIS — I10 ESSENTIAL HYPERTENSION: ICD-10-CM

## 2018-07-09 DIAGNOSIS — E78.2 MIXED HYPERLIPIDEMIA: ICD-10-CM

## 2018-07-09 DIAGNOSIS — J30.2 SEASONAL ALLERGIC RHINITIS, UNSPECIFIED CHRONICITY, UNSPECIFIED TRIGGER: ICD-10-CM

## 2018-07-09 RX ORDER — SIMVASTATIN 20 MG
TABLET ORAL
Qty: 90 TABLET | Refills: 0 | Status: SHIPPED | OUTPATIENT
Start: 2018-07-09 | End: 2018-10-08 | Stop reason: SDUPTHER

## 2018-07-09 RX ORDER — LISINOPRIL 10 MG/1
TABLET ORAL
Qty: 90 TABLET | Refills: 0 | Status: SHIPPED | OUTPATIENT
Start: 2018-07-09 | End: 2018-10-08 | Stop reason: SDUPTHER

## 2018-07-09 RX ORDER — CETIRIZINE HYDROCHLORIDE 10 MG/1
TABLET ORAL
Qty: 90 TABLET | Refills: 0 | Status: SHIPPED | OUTPATIENT
Start: 2018-07-09 | End: 2018-10-08 | Stop reason: SDUPTHER

## 2018-08-07 ENCOUNTER — RESULTS ENCOUNTER (OUTPATIENT)
Dept: ENDOCRINOLOGY | Age: 58
End: 2018-08-07

## 2018-08-07 DIAGNOSIS — E10.9 TYPE 1 DIABETES MELLITUS WITHOUT COMPLICATION (HCC): ICD-10-CM

## 2018-08-07 DIAGNOSIS — E78.2 MIXED HYPERLIPIDEMIA: ICD-10-CM

## 2018-08-07 DIAGNOSIS — I10 ESSENTIAL HYPERTENSION: ICD-10-CM

## 2018-08-08 ENCOUNTER — LAB (OUTPATIENT)
Dept: ENDOCRINOLOGY | Age: 58
End: 2018-08-08

## 2018-08-08 DIAGNOSIS — E10.9 TYPE 1 DIABETES MELLITUS WITHOUT COMPLICATION (HCC): ICD-10-CM

## 2018-08-09 LAB
25(OH)D3+25(OH)D2 SERPL-MCNC: 35.8 NG/ML (ref 30–100)
ALBUMIN SERPL-MCNC: 4.5 G/DL (ref 3.5–5.2)
ALBUMIN/CREAT UR: <4.4 MG/G CREAT (ref 0–30)
ALBUMIN/GLOB SERPL: 1.5 G/DL
ALP SERPL-CCNC: 66 U/L (ref 39–117)
ALT SERPL-CCNC: 26 U/L (ref 1–41)
AST SERPL-CCNC: 25 U/L (ref 1–40)
BILIRUB SERPL-MCNC: 0.4 MG/DL (ref 0.1–1.2)
BUN SERPL-MCNC: 12 MG/DL (ref 6–20)
BUN/CREAT SERPL: 10.3 (ref 7–25)
CALCIUM SERPL-MCNC: 9.7 MG/DL (ref 8.6–10.5)
CHLORIDE SERPL-SCNC: 98 MMOL/L (ref 98–107)
CHOLEST SERPL-MCNC: 135 MG/DL (ref 0–200)
CO2 SERPL-SCNC: 30.9 MMOL/L (ref 22–29)
CREAT SERPL-MCNC: 1.17 MG/DL (ref 0.76–1.27)
CREAT UR-MCNC: 68.4 MG/DL
FRUCTOSAMINE SERPL-SCNC: 282 UMOL/L (ref 0–285)
GLOBULIN SER CALC-MCNC: 3 GM/DL
GLUCOSE SERPL-MCNC: 114 MG/DL (ref 65–99)
HBA1C MFR BLD: 6.5 % (ref 4.8–5.6)
HDLC SERPL-MCNC: 55 MG/DL (ref 40–60)
INSULIN SERPL-ACNC: 3.6 UIU/ML (ref 2.6–24.9)
INTERPRETATION: NORMAL
LDLC SERPL CALC-MCNC: 71 MG/DL (ref 0–100)
Lab: NORMAL
MICROALBUMIN UR-MCNC: <3 UG/ML
POTASSIUM SERPL-SCNC: 4.4 MMOL/L (ref 3.5–5.2)
PROT SERPL-MCNC: 7.5 G/DL (ref 6–8.5)
SODIUM SERPL-SCNC: 139 MMOL/L (ref 136–145)
T3FREE SERPL-MCNC: 3.2 PG/ML (ref 2–4.4)
T4 FREE SERPL-MCNC: 1.11 NG/DL (ref 0.93–1.7)
THYROGLOB AB SERPL-ACNC: <1 IU/ML (ref 0–0.9)
THYROPEROXIDASE AB SERPL-ACNC: 45 IU/ML (ref 0–34)
TRIGL SERPL-MCNC: 43 MG/DL (ref 0–150)
TSH SERPL DL<=0.005 MIU/L-ACNC: 1.36 MIU/ML (ref 0.27–4.2)
URATE SERPL-MCNC: 4.4 MG/DL (ref 3.4–7)
VLDLC SERPL CALC-MCNC: 8.6 MG/DL (ref 5–40)

## 2018-08-22 ENCOUNTER — OFFICE VISIT (OUTPATIENT)
Dept: ENDOCRINOLOGY | Age: 58
End: 2018-08-22

## 2018-08-22 VITALS
DIASTOLIC BLOOD PRESSURE: 78 MMHG | WEIGHT: 198 LBS | HEIGHT: 68 IN | SYSTOLIC BLOOD PRESSURE: 132 MMHG | BODY MASS INDEX: 30.01 KG/M2

## 2018-08-22 DIAGNOSIS — I10 ESSENTIAL HYPERTENSION: ICD-10-CM

## 2018-08-22 DIAGNOSIS — E10.9 TYPE 1 DIABETES MELLITUS WITHOUT COMPLICATION (HCC): Primary | ICD-10-CM

## 2018-08-22 DIAGNOSIS — E55.9 AVITAMINOSIS D: ICD-10-CM

## 2018-08-22 DIAGNOSIS — E78.2 MIXED HYPERLIPIDEMIA: ICD-10-CM

## 2018-08-22 PROCEDURE — 99214 OFFICE O/P EST MOD 30 MIN: CPT | Performed by: NURSE PRACTITIONER

## 2018-08-22 NOTE — PROGRESS NOTES
Fred Nagel  presents to the office  for the follow-up appointment for Type 1 diabetes mellitus.     The diabete's condition location is throughout the system with the  clinical course has fluctuated, the severity is Mild, and the modifying/allievating factors are insulin.  Medications and  Dosages were  reviewed with Fred Nagel and suggested that compliance most of the time.    The patient reports associated symptoms of hyperglycemia have been none and associated symptoms of hypoglycemia have been hunger and weakness, with their  hypoglycemia threshold for symptoms is 60 mg/dl .     The patient is currently on insulin.     Compliance with blood glucose monitoring: good.     Meal panning: The patient is using avoidance of concentrated sweets.    The patient is currently taking home blood tests - Blood glucose testing: 3-4 times daily, that are:  fasting- 1st thing in morning before eating or drinking  before each meal  bedtime   basal insulIn  Lantus U100 20 units in PM  Novolog U100 2 units for every 15 grams of carbs     Last instructions given were:  New instructions for basal insulIn  Lantus U100 22 units in PM  Increase the PM dose 1 units every 1 days if fasting blood glucose is over 100 mg/dl      Novolog 1unit for every 30 above 120 mg/dl - this is done with each meal  2 units or every 15g before breakfast and lunch, 2 units for every 12 grams before dinner    Home blood glucose testing daily: 3-4  FB to 130 mg/dl  before lunch 150 to 160 mg/dl  before dinner/supper 150 to 160 mg/dl  bedtime 100 to 120 mg/dl    Last reported blood glucose readings are:  Home blood glucose testing daily: 3-4 - glucometer upload    Patterns reported per patient are none.         The following portions of the patient's history were reviewed and updated as appropriate: current medications, past family history, past medical history, past social history, past surgical history and problem list.      Current Outpatient  Prescriptions:   •  ACCU-CHEK SOFTCLIX LANCETS lancets, Use to test 8 times daily, Disp: 800 each, Rfl: 1  •  aspirin 81 MG EC tablet, Take 81 mg by mouth Daily., Disp: , Rfl:   •  cetirizine (zyrTEC) 10 MG tablet, TAKE 1 TABLET DAILY (NEED APPOINTMENT FOR FURTHER REFILLS), Disp: 90 tablet, Rfl: 0  •  glucagon (GLUCAGON EMERGENCY) 1 MG injection, Inject 1 mg under the skin 1 (One) Time As Needed for Low Blood Sugar for up to 1 dose., Disp: 2 kit, Rfl: 2  •  glucose blood (ACCU-CHEK HAI) test strip, Use to test 8 times daily, Disp: 800 each, Rfl: 1  •  hydrochlorothiazide (HYDRODIURIL) 25 MG tablet, Take 1 tablet by mouth Daily. *PT MUST MAKE APPT FOR FUTURE REFILLS*, Disp: 90 tablet, Rfl: 0  •  insulin aspart (NOVOLOG FLEXPEN) 100 UNIT/ML solution pen-injector sc pen, Inject 4 Units under the skin 3 (Three) Times a Day With Meals., Disp: 18 mL, Rfl: 1  •  Insulin Pen Needle 31G X 4 MM misc, 1 pen 4 (Four) Times a Day., Disp: 400 each, Rfl: 2  •  LANTUS SOLOSTAR 100 UNIT/ML injection pen, INJECT 20 UNITS UNDER THE SKIN EVERY NIGHT, Disp: 15 mL, Rfl: 0  •  lisinopril (PRINIVIL,ZESTRIL) 10 MG tablet, TAKE 1 TABLET DAILY (NEED APPOINTMENT FOR FURTHER REFILLS), Disp: 90 tablet, Rfl: 0  •  Multiple Vitamins-Minerals (MULTIVITAMIN WITH MINERALS) tablet tablet, Take 1 tablet by mouth Daily., Disp: , Rfl:   •  NOVOLOG FLEXPEN 100 UNIT/ML solution pen-injector sc pen, INJECT 4 TO 8 UNITS UNDER THE SKIN THREE TIMES A DAY WITH MEALS AND USE SLIDING SCALE AS DIRECTED 1 UNIT FOR EVERY 35 ABOVE 150 MG/DL., Disp: 30 mL, Rfl: 0  •  simvastatin (ZOCOR) 20 MG tablet, TAKE 1 TABLET EVERY NIGHT (NEED APPOINTMENT FOR FURTHER REFILLS), Disp: 90 tablet, Rfl: 0    Patient Active Problem List    Diagnosis   • Avitaminosis D [E55.9]   • Mixed hyperlipidemia [E78.2]   • Seasonal allergies [J30.2]   • Type 1 diabetes mellitus without complication (CMS/HCC) [E10.9]     Overview Note:     Managed by endocrinology     • Hyperlipidemia [E78.5]  "  • Essential hypertension [I10]   • Benign brain tumor (CMS/HCC) [D33.2]       Review of Systems   A comprehensive review of the 14 systems was negative except of listed below:  Endocrine: hypoglycemia  hyperglycemia     Objective:     Wt Readings from Last 3 Encounters:   08/22/18 89.8 kg (198 lb)   07/03/18 89 kg (196 lb 3.2 oz)   06/29/18 83.9 kg (185 lb)     Temp Readings from Last 3 Encounters:   07/03/18 97.9 °F (36.6 °C)   06/29/18 97.6 °F (36.4 °C) (Oral)   11/02/17 97.8 °F (36.6 °C)     BP Readings from Last 3 Encounters:   08/22/18 132/78   07/03/18 122/82   06/29/18 140/91     Pulse Readings from Last 3 Encounters:   07/03/18 71   06/29/18 82   11/02/17 78        /78   Ht 172.7 cm (67.99\")   Wt 89.8 kg (198 lb)   BMI 30.11 kg/m²     General appearance:  alert, cooperative, delirious, fatigued, nourished\" \"appears stated age and cooperative\" \"NAD   Neck: no carotid bruit, supple, symmetrical, trachea midline and thyroid not enlarged, symmetric, no tenderness/mass/nodules   Thyroid:  no palpable nodule, no enlargement, no tenderness   Lung:  \"clear to auscultation bilaterally\" \"no abnormal breath sounds  \" effort was normal, no labored breath, no use of accessory muscles.   Heart: regular rate and rhythm, S1, S2 normal, no murmur, click, rub or gallop      Abdomen:  normal bowel sounds- 4 quads, soft non-tender and contour - slt rounded      Extremities: extremities normal, atraumatic, no cyanosis or edema extremities normal, atraumatic, no cyanosis or edema\" WNL - gait and station, strength and stability\"       Skin:   Pulses:  warm and dry, no hyperpigmentation, normal skin coloring, or suspicious lesions   2+ and symmetric   Neuro: Alert and oriented x3. Gait normal. Reflexes and motor strength normal and symmetric. Cranial nerves 2-12 and sensation grossly intact.      Psych: behavior - normal, judgement - normal, mood - normal, affect - normal                 Lab Review  Results for orders " placed or performed in visit on 08/07/18   Fructosamine   Result Value Ref Range    Fructosamine 282 0 - 285 umol/L   Comprehensive Metabolic Panel   Result Value Ref Range    Glucose 114 (H) 65 - 99 mg/dL    BUN 12 6 - 20 mg/dL    Creatinine 1.17 0.76 - 1.27 mg/dL    eGFR Non African Am 64 >60 mL/min/1.73    eGFR African Am 78 >60 mL/min/1.73    BUN/Creatinine Ratio 10.3 7.0 - 25.0    Sodium 139 136 - 145 mmol/L    Potassium 4.4 3.5 - 5.2 mmol/L    Chloride 98 98 - 107 mmol/L    Total CO2 30.9 (H) 22.0 - 29.0 mmol/L    Calcium 9.7 8.6 - 10.5 mg/dL    Total Protein 7.5 6.0 - 8.5 g/dL    Albumin 4.50 3.50 - 5.20 g/dL    Globulin 3.0 gm/dL    A/G Ratio 1.5 g/dL    Total Bilirubin 0.4 0.1 - 1.2 mg/dL    Alkaline Phosphatase 66 39 - 117 U/L    AST (SGOT) 25 1 - 40 U/L    ALT (SGPT) 26 1 - 41 U/L   Hemoglobin A1c   Result Value Ref Range    Hemoglobin A1C 6.50 (H) 4.80 - 5.60 %   Insulin, Total   Result Value Ref Range    Insulin 3.6 2.6 - 24.9 uIU/mL   Lipid Panel   Result Value Ref Range    Total Cholesterol 135 0 - 200 mg/dL    Triglycerides 43 0 - 150 mg/dL    HDL Cholesterol 55 40 - 60 mg/dL    VLDL Cholesterol 8.6 5 - 40 mg/dL    LDL Cholesterol  71 0 - 100 mg/dL   Uric Acid   Result Value Ref Range    Uric Acid 4.4 3.4 - 7.0 mg/dL   Vitamin D 25 Hydroxy   Result Value Ref Range    25 Hydroxy, Vitamin D 35.8 30.0 - 100.0 ng/mL   TSH   Result Value Ref Range    TSH 1.360 0.270 - 4.200 mIU/mL   Thyroid Antibodies   Result Value Ref Range    Thyroid Peroxidase Antibody 45 (H) 0 - 34 IU/mL    Thyroglobulin Ab <1.0 0.0 - 0.9 IU/mL   T4, Free   Result Value Ref Range    Free T4 1.11 0.93 - 1.70 ng/dL   T3, Free   Result Value Ref Range    T3, Free 3.2 2.0 - 4.4 pg/mL   Microalbumin / Creatinine Urine Ratio   Result Value Ref Range    Creatinine, Urine 68.4 Not Estab. mg/dL    Microalbumin, Urine <3.0 Not Estab. ug/mL    Microalbumin/Creatinine Ratio <4.4 0.0 - 30.0 mg/g creat   Cardiovascular Risk Assessment   Result  Value Ref Range    Interpretation Note    Diabetes Patient Education   Result Value Ref Range    PDF Image Not applicable            Assessment:   Fred was seen today for follow-up.    Diagnoses and all orders for this visit:    Type 1 diabetes mellitus without complication (CMS/[facility])  -     Fructosamine; Future  -     Lipid Panel; Future  -     Comprehensive Metabolic Panel; Future  -     Hemoglobin A1c; Future  -     Insulin, Total; Future  -     Lipid Panel; Future  -     Microalbumin / Creatinine Urine Ratio - Urine, Clean Catch; Future  -     Uric Acid; Future  -     Vitamin D 25 Hydroxy; Future  -     TSH; Future  -     Thyroid Antibodies; Future  -     T4, Free; Future  -     T3, Free; Future    Mixed hyperlipidemia  -     Lipid Panel; Future  -     Comprehensive Metabolic Panel; Future  -     Lipid Panel; Future    Avitaminosis D  -     Comprehensive Metabolic Panel; Future  -     Vitamin D 25 Hydroxy; Future    Essential hypertension  -     Comprehensive Metabolic Panel; Future          Plan:   In summary:  I met with the patient that is metabolic stable and doing well.  Patient has been watching medical nutrition taken insulin as prescribed also disclosed to this pack this provider that he was still having difficulty with the [employer] insulin pump and return in it and getting bills from [employer].  The lab work prior obtained to the visit at this time there will be no insulin changes or medication changes.  Discussed the Inpen and Dexcom 6 with patient.  Patient is interested in this concept.      Medication changes:     home blood tests -  Blood glucose testing: 3-4 times daily, that are:  fasting- 1st thing in morning before eating or drinking  before each meal and 1 or 2 hours after meal  bedtime  anytime you feel symptoms of hyperglycemia or hypoglycemia (high or low blood sugars)        Education:  interpretation of lab results, blood sugar goals, complications of diabetes mellitus, hypoglycemia  prevention and treatment, exercise, illness management, self-monitoring of blood glucose skills, nutrition, carbohydrate counting, site rotation, use of insulin pen, insulin adjustments, self-injection of insulin, use of sliding scale/correction formula and use of insulin: carb ratio        The total face to face time spent was  25 minutes  with additional education given: 14 minutes (greater than 50% of the total time) was spent with counseling and coordination of care on: SMBG with goals, Side effects profiles with medications, medication use and purposes,    Return in about 5 months (around 1/22/2019), or if symptoms worsen or fail to improve, for Recheck. Labs 2 weeks prior for labs      Dragon transcription disclaimer     Much of this encounter note is an electronic transcription/translation of spoken language to printed text. The electronic translation of spoken language may permit erroneous, or at times, nonsensical words or phrases to be inadvertently transcribed. Although I have reviewed the note for such errors, some may still exist.

## 2018-08-23 DIAGNOSIS — I10 ESSENTIAL HYPERTENSION: ICD-10-CM

## 2018-08-23 RX ORDER — HYDROCHLOROTHIAZIDE 25 MG/1
TABLET ORAL
Qty: 90 TABLET | Refills: 1 | Status: SHIPPED | OUTPATIENT
Start: 2018-08-23 | End: 2019-01-02 | Stop reason: SDUPTHER

## 2018-09-26 DIAGNOSIS — E10.9 TYPE 1 DIABETES MELLITUS WITHOUT COMPLICATION (HCC): ICD-10-CM

## 2018-09-27 RX ORDER — INSULIN ASPART 100 [IU]/ML
INJECTION, SOLUTION INTRAVENOUS; SUBCUTANEOUS
Qty: 30 ML | Refills: 0 | Status: SHIPPED | OUTPATIENT
Start: 2018-09-27 | End: 2019-01-02 | Stop reason: SDUPTHER

## 2018-10-05 RX ORDER — BLOOD SUGAR DIAGNOSTIC
STRIP MISCELLANEOUS
Qty: 800 EACH | Refills: 0 | Status: SHIPPED | OUTPATIENT
Start: 2018-10-05 | End: 2019-01-11

## 2018-10-08 DIAGNOSIS — J30.2 SEASONAL ALLERGIC RHINITIS: ICD-10-CM

## 2018-10-08 DIAGNOSIS — E78.2 MIXED HYPERLIPIDEMIA: ICD-10-CM

## 2018-10-08 DIAGNOSIS — I10 ESSENTIAL HYPERTENSION: ICD-10-CM

## 2018-10-09 RX ORDER — SIMVASTATIN 20 MG
TABLET ORAL
Qty: 90 TABLET | Refills: 0 | Status: SHIPPED | OUTPATIENT
Start: 2018-10-09 | End: 2019-01-02 | Stop reason: SDUPTHER

## 2018-10-09 RX ORDER — LISINOPRIL 10 MG/1
TABLET ORAL
Qty: 90 TABLET | Refills: 0 | Status: SHIPPED | OUTPATIENT
Start: 2018-10-09 | End: 2018-12-19 | Stop reason: SDUPTHER

## 2018-10-09 RX ORDER — CETIRIZINE HYDROCHLORIDE 10 MG/1
TABLET ORAL
Qty: 90 TABLET | Refills: 0 | Status: SHIPPED | OUTPATIENT
Start: 2018-10-09 | End: 2018-12-19 | Stop reason: SDUPTHER

## 2018-11-22 ENCOUNTER — RESULTS ENCOUNTER (OUTPATIENT)
Dept: ENDOCRINOLOGY | Age: 58
End: 2018-11-22

## 2018-11-22 DIAGNOSIS — E78.2 MIXED HYPERLIPIDEMIA: ICD-10-CM

## 2018-11-22 DIAGNOSIS — E55.9 AVITAMINOSIS D: ICD-10-CM

## 2018-11-22 DIAGNOSIS — E10.9 TYPE 1 DIABETES MELLITUS WITHOUT COMPLICATION (HCC): ICD-10-CM

## 2018-11-22 DIAGNOSIS — I10 ESSENTIAL HYPERTENSION: ICD-10-CM

## 2018-12-12 DIAGNOSIS — E55.9 AVITAMINOSIS D: ICD-10-CM

## 2018-12-12 DIAGNOSIS — E78.2 MIXED HYPERLIPIDEMIA: Primary | ICD-10-CM

## 2018-12-12 DIAGNOSIS — E10.9 TYPE 1 DIABETES MELLITUS WITHOUT COMPLICATION (HCC): ICD-10-CM

## 2018-12-19 ENCOUNTER — LAB (OUTPATIENT)
Dept: ENDOCRINOLOGY | Age: 58
End: 2018-12-19

## 2018-12-19 DIAGNOSIS — E78.2 MIXED HYPERLIPIDEMIA: ICD-10-CM

## 2018-12-19 DIAGNOSIS — J30.2 SEASONAL ALLERGIC RHINITIS: ICD-10-CM

## 2018-12-19 DIAGNOSIS — E10.9 TYPE 1 DIABETES MELLITUS WITHOUT COMPLICATION (HCC): ICD-10-CM

## 2018-12-19 DIAGNOSIS — I10 ESSENTIAL HYPERTENSION: ICD-10-CM

## 2018-12-19 DIAGNOSIS — E55.9 AVITAMINOSIS D: ICD-10-CM

## 2018-12-19 RX ORDER — LISINOPRIL 10 MG/1
TABLET ORAL
Qty: 90 TABLET | Refills: 0 | Status: SHIPPED | OUTPATIENT
Start: 2018-12-19 | End: 2019-01-02 | Stop reason: SDUPTHER

## 2018-12-19 RX ORDER — CETIRIZINE HYDROCHLORIDE 10 MG/1
TABLET ORAL
Qty: 90 TABLET | Refills: 0 | Status: SHIPPED | OUTPATIENT
Start: 2018-12-19 | End: 2019-04-10 | Stop reason: SDUPTHER

## 2018-12-20 LAB
25(OH)D3+25(OH)D2 SERPL-MCNC: 45 NG/ML (ref 30–100)
ALBUMIN SERPL-MCNC: 4.4 G/DL (ref 3.5–5.2)
ALBUMIN/GLOB SERPL: 1.3 G/DL
ALP SERPL-CCNC: 72 U/L (ref 39–117)
ALT SERPL-CCNC: 26 U/L (ref 1–41)
AST SERPL-CCNC: 28 U/L (ref 1–40)
BILIRUB SERPL-MCNC: 0.5 MG/DL (ref 0.1–1.2)
BUN SERPL-MCNC: 14 MG/DL (ref 6–20)
BUN/CREAT SERPL: 12 (ref 7–25)
C PEPTIDE SERPL-MCNC: 0.4 NG/ML (ref 1.1–4.4)
CALCIUM SERPL-MCNC: 9.6 MG/DL (ref 8.6–10.5)
CHLORIDE SERPL-SCNC: 99 MMOL/L (ref 98–107)
CHOLEST SERPL-MCNC: 145 MG/DL (ref 0–200)
CO2 SERPL-SCNC: 30.1 MMOL/L (ref 22–29)
CREAT SERPL-MCNC: 1.17 MG/DL (ref 0.76–1.27)
GLOBULIN SER CALC-MCNC: 3.3 GM/DL
GLUCOSE SERPL-MCNC: 139 MG/DL (ref 65–99)
HBA1C MFR BLD: 6.1 % (ref 4.8–5.6)
HDLC SERPL-MCNC: 61 MG/DL (ref 40–60)
INTERPRETATION: NORMAL
LDLC SERPL CALC-MCNC: 73 MG/DL (ref 0–100)
Lab: NORMAL
MICROALBUMIN UR-MCNC: <3 UG/ML
POTASSIUM SERPL-SCNC: 4.1 MMOL/L (ref 3.5–5.2)
PROT SERPL-MCNC: 7.7 G/DL (ref 6–8.5)
SODIUM SERPL-SCNC: 138 MMOL/L (ref 136–145)
T4 SERPL-MCNC: 5.82 MCG/DL (ref 4.5–11.7)
TRIGL SERPL-MCNC: 53 MG/DL (ref 0–150)
TSH SERPL DL<=0.005 MIU/L-ACNC: 1.49 MIU/ML (ref 0.27–4.2)
URATE SERPL-MCNC: 4.2 MG/DL (ref 3.4–7)
VLDLC SERPL CALC-MCNC: 10.6 MG/DL (ref 5–40)

## 2019-01-02 ENCOUNTER — OFFICE VISIT (OUTPATIENT)
Dept: ENDOCRINOLOGY | Age: 59
End: 2019-01-02

## 2019-01-02 VITALS
BODY MASS INDEX: 30.22 KG/M2 | RESPIRATION RATE: 16 BRPM | HEIGHT: 68 IN | DIASTOLIC BLOOD PRESSURE: 82 MMHG | WEIGHT: 199.4 LBS | SYSTOLIC BLOOD PRESSURE: 124 MMHG

## 2019-01-02 DIAGNOSIS — E78.2 MIXED HYPERLIPIDEMIA: ICD-10-CM

## 2019-01-02 DIAGNOSIS — E10.649 HYPOGLYCEMIC INSULIN REACTION IN TYPE 1 DIABETES MELLITUS (HCC): ICD-10-CM

## 2019-01-02 DIAGNOSIS — E55.9 AVITAMINOSIS D: ICD-10-CM

## 2019-01-02 DIAGNOSIS — E10.9 TYPE 1 DIABETES MELLITUS WITHOUT COMPLICATION (HCC): Primary | ICD-10-CM

## 2019-01-02 DIAGNOSIS — I10 ESSENTIAL HYPERTENSION: ICD-10-CM

## 2019-01-02 PROCEDURE — 99215 OFFICE O/P EST HI 40 MIN: CPT | Performed by: INTERNAL MEDICINE

## 2019-01-02 RX ORDER — HYDROCHLOROTHIAZIDE 25 MG/1
TABLET ORAL
Qty: 90 TABLET | Refills: 3 | Status: SHIPPED | OUTPATIENT
Start: 2019-01-02 | End: 2019-05-16 | Stop reason: SDUPTHER

## 2019-01-02 RX ORDER — SIMVASTATIN 20 MG
TABLET ORAL
Qty: 90 TABLET | Refills: 3 | Status: SHIPPED | OUTPATIENT
Start: 2019-01-02 | End: 2019-05-16 | Stop reason: SDUPTHER

## 2019-01-02 RX ORDER — LISINOPRIL 10 MG/1
TABLET ORAL
Qty: 90 TABLET | Refills: 3 | Status: SHIPPED | OUTPATIENT
Start: 2019-01-02 | End: 2019-05-16 | Stop reason: SDUPTHER

## 2019-01-02 RX ORDER — INSULIN ASPART 100 [IU]/ML
INJECTION, SOLUTION INTRAVENOUS; SUBCUTANEOUS
Qty: 90 ML | Refills: 3 | Status: SHIPPED | OUTPATIENT
Start: 2019-01-02 | End: 2019-01-09 | Stop reason: CLARIF

## 2019-01-02 NOTE — PROGRESS NOTES
"Subjective   Fred Nagel is a 58 y.o. male seen for follow up for DM1, hyperlipidemia, HTN, lab review. Patient states that he called x one month ago to get a new meter but did not hear back from Hilda. He is checking BG 6 times a day. He denies any other problems or concerns.   History of Present Illness this is a 58-year-old gentleman known patient with type I diabetes hypertension and dyslipidemia as well as vitamin D deficiency.  Over the course of last 6 months he has had no significant health problems for which to go to the emergency room or hospital.  He has had a Medtronic 670 G pump however he has never used that because of the misunderstanding about he is chair of the payment but at any rate he still is that pump and is willing to be trained on it.  He has had a few low blood sugars which has been treated by himself or family members.  There is no clear pattern to his hypoglycemic reactions and of course I mentioned to him that once he started using his pump by virtue of the fact that this pump comes with a sensor it is an effective way of preventing and detecting hypoglycemia more effectively.    /82   Resp 16   Ht 172.7 cm (68\")   Wt 90.4 kg (199 lb 6.4 oz)   BMI 30.32 kg/m²      No Known Allergies    Current Outpatient Medications:   •  ACCU-CHEK SOFTCLIX LANCETS lancets, Use to test 8 times daily, Disp: 800 each, Rfl: 1  •  aspirin 81 MG EC tablet, Take 81 mg by mouth Daily., Disp: , Rfl:   •  cetirizine (zyrTEC) 10 MG tablet, TAKE 1 TABLET DAILY (NEED APPOINTMENT FOR FURTHER REFILLS), Disp: 90 tablet, Rfl: 0  •  glucagon (GLUCAGON EMERGENCY) 1 MG injection, Inject 1 mg under the skin 1 (One) Time As Needed for Low Blood Sugar for up to 1 dose., Disp: 2 kit, Rfl: 2  •  hydrochlorothiazide (HYDRODIURIL) 25 MG tablet, TAKE 1 TABLET DAILY (MUST MAKE APPOINTMENT FOR FUTURE REFILLS), Disp: 90 tablet, Rfl: 1  •  Insulin Glargine (LANTUS SOLOSTAR) 100 UNIT/ML injection pen, 20u @ HS titrate as " instructed, Disp: 15 mL, Rfl: 2  •  Insulin Pen Needle 31G X 4 MM misc, 1 pen 4 (Four) Times a Day., Disp: 400 each, Rfl: 2  •  lisinopril (PRINIVIL,ZESTRIL) 10 MG tablet, TAKE 1 TABLET DAILY (NEED APPOINTMENT FOR FURTHER REFILLS), Disp: 90 tablet, Rfl: 0  •  Multiple Vitamins-Minerals (MULTIVITAMIN WITH MINERALS) tablet tablet, Take 1 tablet by mouth Daily., Disp: , Rfl:   •  NOVOLOG FLEXPEN 100 UNIT/ML solution pen-injector sc pen, USE 2 UNITS FOR 15 GRAMS OF CARBOHYDRATE WITH CORRECTION MAXIMUM 100 UNITS DAILY, Disp: 30 mL, Rfl: 0  •  PRECISION XTRA TEST STRIPS test strip, USE TO TEST EIGHT TIMES DAILY, Disp: 800 each, Rfl: 0  •  simvastatin (ZOCOR) 20 MG tablet, TAKE 1 TABLET EVERY NIGHT (NEED APPOINTMENT FOR FURTHER REFILLS), Disp: 90 tablet, Rfl: 0      The following portions of the patient's history were reviewed and updated as appropriate: allergies, current medications, past family history, past medical history, past social history, past surgical history and problem list.    Review of Systems   Constitutional: Negative.    HENT: Negative.    Eyes: Negative.    Respiratory: Negative.    Cardiovascular: Negative.    Gastrointestinal: Negative.    Endocrine: Negative.    Genitourinary: Negative.    Musculoskeletal: Negative.    Skin: Negative.    Allergic/Immunologic: Negative.    Neurological: Negative.    Hematological: Negative.    Psychiatric/Behavioral: Negative.        Objective   Physical Exam   Constitutional: He is oriented to person, place, and time. Vital signs are normal. He appears well-developed and well-nourished. He is cooperative. He does not appear ill. No distress.   HENT:   Head: Normocephalic and atraumatic.   Right Ear: External ear normal.   Left Ear: External ear normal.   Nose: Nose normal.   Mouth/Throat: Oropharynx is clear and moist. No oropharyngeal exudate.   Eyes: Conjunctivae and EOM are normal. Pupils are equal, round, and reactive to light. Right eye exhibits no discharge.  Left eye exhibits no discharge. No scleral icterus.   Neck: Normal range of motion. Neck supple. No JVD present. No tracheal deviation present. No thyromegaly present.   Cardiovascular: Normal rate, regular rhythm, S1 normal, S2 normal, normal heart sounds and intact distal pulses. Exam reveals no gallop and no friction rub.   No murmur heard.  Pulmonary/Chest: Effort normal and breath sounds normal. No stridor. No respiratory distress. He has no decreased breath sounds. He has no wheezes. He has no rhonchi. He has no rales. He exhibits no tenderness.   Abdominal: Soft. Bowel sounds are normal. He exhibits no distension and no mass. There is no tenderness. There is no rebound and no guarding. No hernia.   Musculoskeletal: Normal range of motion. He exhibits no edema, tenderness or deformity.   Lymphadenopathy:     He has no cervical adenopathy.   Neurological: He is alert and oriented to person, place, and time. He displays normal reflexes. No cranial nerve deficit or sensory deficit. He exhibits normal muscle tone. Coordination normal.   Skin: Skin is warm, dry and intact. No rash noted. He is not diaphoretic. No erythema. No pallor.   Psychiatric: He has a normal mood and affect. His speech is normal and behavior is normal. Judgment and thought content normal. Cognition and memory are normal.   Nursing note and vitals reviewed.       Results for orders placed or performed in visit on 12/19/18   Vitamin D 25 Hydroxy   Result Value Ref Range    25 Hydroxy, Vitamin D 45.0 30.0 - 100.0 ng/mL   Uric Acid   Result Value Ref Range    Uric Acid 4.2 3.4 - 7.0 mg/dL   MicroAlbumin, Urine, Random - Urine, Clean Catch   Result Value Ref Range    Microalbumin, Urine <3.0 Not Estab. ug/mL   Lipid Panel   Result Value Ref Range    Total Cholesterol 145 0 - 200 mg/dL    Triglycerides 53 0 - 150 mg/dL    HDL Cholesterol 61 (H) 40 - 60 mg/dL    VLDL Cholesterol 10.6 5 - 40 mg/dL    LDL Cholesterol  73 0 - 100 mg/dL   Hemoglobin A1c    Result Value Ref Range    Hemoglobin A1C 6.10 (H) 4.80 - 5.60 %   C-Peptide   Result Value Ref Range    C-Peptide 0.4 (L) 1.1 - 4.4 ng/mL   Comprehensive Metabolic Panel   Result Value Ref Range    Glucose 139 (H) 65 - 99 mg/dL    BUN 14 6 - 20 mg/dL    Creatinine 1.17 0.76 - 1.27 mg/dL    eGFR Non African Am 64 >60 mL/min/1.73    eGFR African Am 78 >60 mL/min/1.73    BUN/Creatinine Ratio 12.0 7.0 - 25.0    Sodium 138 136 - 145 mmol/L    Potassium 4.1 3.5 - 5.2 mmol/L    Chloride 99 98 - 107 mmol/L    Total CO2 30.1 (H) 22.0 - 29.0 mmol/L    Calcium 9.6 8.6 - 10.5 mg/dL    Total Protein 7.7 6.0 - 8.5 g/dL    Albumin 4.40 3.50 - 5.20 g/dL    Globulin 3.3 gm/dL    A/G Ratio 1.3 g/dL    Total Bilirubin 0.5 0.1 - 1.2 mg/dL    Alkaline Phosphatase 72 39 - 117 U/L    AST (SGOT) 28 1 - 40 U/L    ALT (SGPT) 26 1 - 41 U/L   T4 & TSH ([facility])   Result Value Ref Range    TSH 1.490 0.270 - 4.200 mIU/mL    T4, Total 5.82 4.50 - 11.70 mcg/dL   Cardiovascular Risk Assessment   Result Value Ref Range    Interpretation Note    Diabetes Patient Education   Result Value Ref Range    PDF Image Not applicable          Assessment/Plan   Diagnoses and all orders for this visit:    Type 1 diabetes mellitus without complication (CMS/[facility])  -     T4 & TSH ([facility]); Future  -     Uric Acid; Future  -     Vitamin D 25 Hydroxy; Future  -     Comprehensive Metabolic Panel; Future  -     Hemoglobin A1c; Future  -     Lipid Panel; Future  -     MicroAlbumin, Urine, Random - Urine, Clean Catch; Future  -     NOVOLOG FLEXPEN 100 UNIT/ML solution pen-injector sc pen; Used in a pump with 100 units daily  -     Insulin Glargine (LANTUS SOLOSTAR) 100 UNIT/ML injection pen; 20u @ HS titrate as instructed  -     glucagon (GLUCAGON EMERGENCY) 1 MG injection; Inject 1 mg under the skin into the appropriate area as directed 1 (One) Time As Needed for Low Blood Sugar for up to 1 dose.    Mixed hyperlipidemia  -     T4 & TSH ([facility]); Future  -     Uric  Acid; Future  -     Vitamin D 25 Hydroxy; Future  -     Comprehensive Metabolic Panel; Future  -     Hemoglobin A1c; Future  -     Lipid Panel; Future  -     MicroAlbumin, Urine, Random - Urine, Clean Catch; Future  -     simvastatin (ZOCOR) 20 MG tablet; Take 1 daily by mouth    Essential hypertension  -     T4 & TSH ([facility]); Future  -     Uric Acid; Future  -     Vitamin D 25 Hydroxy; Future  -     Comprehensive Metabolic Panel; Future  -     Hemoglobin A1c; Future  -     Lipid Panel; Future  -     MicroAlbumin, Urine, Random - Urine, Clean Catch; Future  -     lisinopril (PRINIVIL,ZESTRIL) 10 MG tablet; 1 tablet daily by mouth  -     hydrochlorothiazide (HYDRODIURIL) 25 MG tablet; 1 tablet daily by mouth    Avitaminosis D  -     T4 & TSH ([facility]); Future  -     Uric Acid; Future  -     Vitamin D 25 Hydroxy; Future  -     Comprehensive Metabolic Panel; Future  -     Hemoglobin A1c; Future  -     Lipid Panel; Future  -     MicroAlbumin, Urine, Random - Urine, Clean Catch; Future    Hypoglycemic insulin reaction in type 1 diabetes mellitus (CMS/HCC)  -     glucagon (GLUCAGON EMERGENCY) 1 MG injection; Inject 1 mg under the skin into the appropriate area as directed 1 (One) Time As Needed for Low Blood Sugar for up to 1 dose.    Other orders  -     Insulin Pen Needle 31G X 4 MM misc; 1 pen 4 (Four) Times a Day.               In summary I saw and examined this 58-year-old male for above-mentioned problems.  I reviewed his laboratory evaluation of 12/19/2018 and provided him with a hard copy of it.  Overall he is clinically and metabolically stable and therefore we will go ahead and continue all his current prescriptions.  I also introduced him to Ms. Nancy Polo of [employer] to arrange his training.  He will see Ms. Hilda Thomaslouise in 4 months or sooner if needed with laboratory evaluation prior to each office visit.  This office visit lasted 45 minutes with the 25 minutes of it being spent on face-to-face  patient counseling and education.

## 2019-01-07 DIAGNOSIS — E10.9 TYPE 1 DIABETES MELLITUS WITHOUT COMPLICATION (HCC): ICD-10-CM

## 2019-01-11 ENCOUNTER — OFFICE VISIT (OUTPATIENT)
Dept: INTERNAL MEDICINE | Age: 59
End: 2019-01-11

## 2019-01-11 VITALS
HEIGHT: 68 IN | SYSTOLIC BLOOD PRESSURE: 118 MMHG | BODY MASS INDEX: 30.07 KG/M2 | DIASTOLIC BLOOD PRESSURE: 76 MMHG | TEMPERATURE: 97.5 F | WEIGHT: 198.4 LBS | HEART RATE: 105 BPM | OXYGEN SATURATION: 98 %

## 2019-01-11 DIAGNOSIS — I10 ESSENTIAL HYPERTENSION: ICD-10-CM

## 2019-01-11 DIAGNOSIS — E78.2 MIXED HYPERLIPIDEMIA: ICD-10-CM

## 2019-01-11 DIAGNOSIS — Z23 ENCOUNTER FOR IMMUNIZATION: ICD-10-CM

## 2019-01-11 DIAGNOSIS — E10.9 TYPE 1 DIABETES MELLITUS WITHOUT COMPLICATION (HCC): Primary | ICD-10-CM

## 2019-01-11 PROCEDURE — 90732 PPSV23 VACC 2 YRS+ SUBQ/IM: CPT | Performed by: NURSE PRACTITIONER

## 2019-01-11 PROCEDURE — 99214 OFFICE O/P EST MOD 30 MIN: CPT | Performed by: NURSE PRACTITIONER

## 2019-01-11 PROCEDURE — 90471 IMMUNIZATION ADMIN: CPT | Performed by: NURSE PRACTITIONER

## 2019-01-11 NOTE — PROGRESS NOTES
Subjective   Fred Nagel is a 58 y.o. male.     Diabetes   He presents for his follow-up (Managed by endocrinology) diabetic visit. He has type 1 diabetes mellitus. His disease course has been stable. There are no hypoglycemic associated symptoms. Pertinent negatives for hypoglycemia include no headaches or sweats. There are no diabetic associated symptoms. Pertinent negatives for diabetes include no blurred vision, no chest pain, no polydipsia, no polyphagia, no polyuria and no weight loss. There are no hypoglycemic complications. There are no diabetic complications. Risk factors for coronary artery disease include diabetes mellitus, dyslipidemia, male sex, obesity and homocysteinemia. Current diabetic treatment includes insulin injections and intensive insulin program. His weight is stable. He is following a diabetic diet. An ACE inhibitor/angiotensin II receptor blocker is being taken. Eye exam is current.   Hypertension   This is a chronic problem. The problem is unchanged. The problem is controlled. Pertinent negatives include no anxiety, blurred vision, chest pain, headaches, malaise/fatigue, neck pain, orthopnea, palpitations, peripheral edema, PND, shortness of breath or sweats. Current antihypertension treatment includes ACE inhibitors and diuretics. Compliance problems include diet.  There is no history of a thyroid problem.   Hyperlipidemia   This is a chronic problem. The problem is controlled. Exacerbating diseases include diabetes and obesity. Pertinent negatives include no chest pain or shortness of breath. Current antihyperlipidemic treatment includes statins. There are no compliance problems.         The following portions of the patient's history were reviewed and updated as appropriate: allergies, current medications, past family history, past medical history, past social history, past surgical history and problem list.    Review of Systems   Constitutional: Negative for chills, fever,  malaise/fatigue, unexpected weight gain and unexpected weight loss.   Eyes: Negative for blurred vision.   Respiratory: Negative for shortness of breath.    Cardiovascular: Negative for chest pain, palpitations, orthopnea, leg swelling and PND.   Endocrine: Negative for polydipsia, polyphagia and polyuria.   Musculoskeletal: Negative for neck pain.       Objective   Physical Exam   Constitutional: He appears well-developed and well-nourished. He is active. He does not appear ill. No distress.   Cardiovascular: Normal rate, regular rhythm and normal heart sounds.   Pulmonary/Chest: Effort normal and breath sounds normal.   Neurological: He is alert.   Psychiatric: He has a normal mood and affect. His speech is normal and behavior is normal. Thought content normal.   Nursing note and vitals reviewed.    Lab Results   Component Value Date    GLUCOSE 107 (H) 06/29/2018    BUN 14 12/19/2018    CREATININE 1.17 12/19/2018    EGFRIFNONA 64 12/19/2018    EGFRIFAFRI 78 12/19/2018    BCR 12.0 12/19/2018    K 4.1 12/19/2018    CO2 30.1 (H) 12/19/2018    CALCIUM 9.6 12/19/2018    PROTENTOTREF 7.7 12/19/2018    ALBUMIN 4.40 12/19/2018    LABIL2 1.3 12/19/2018    AST 28 12/19/2018    ALT 26 12/19/2018     Lab Results   Component Value Date    CHLPL 145 12/19/2018    CHLPL 135 08/08/2018    CHLPL 145 04/30/2018     Lab Results   Component Value Date    TRIG 53 12/19/2018    TRIG 43 08/08/2018    TRIG 63 04/30/2018     Lab Results   Component Value Date    HDL 61 (H) 12/19/2018    HDL 55 08/08/2018    HDL 62 (H) 04/30/2018     Lab Results   Component Value Date    LDL 73 12/19/2018    LDL 71 08/08/2018    LDL 70 04/30/2018     Lab Results   Component Value Date    HGBA1C 6.10 (H) 12/19/2018     Lab Results   Component Value Date    MICROALBUR <3.0 12/19/2018         Assessment/Plan   Problems Addressed this Visit        Cardiovascular and Mediastinum    Essential hypertension    Mixed hyperlipidemia       Endocrine    Type 1  diabetes mellitus without complication (CMS/[facility]) - Primary    Relevant Orders    Pneumococcal Polysaccharide Vaccine 23-Valent Greater Than or Equal To 1yo Subcutaneous / IM (Completed)      Other Visit Diagnoses     Encounter for immunization        Relevant Orders    Pneumococcal Polysaccharide Vaccine 23-Valent Greater Than or Equal To 1yo Subcutaneous / IM (Completed)        1. Type 1 diabetes mellitus without complication (CMS/[facility])  Last A1c 6.1. Managed by endocrinology, possibly getting insulin pump/sensor soon. Managed by endocrinology.     - Pneumococcal Polysaccharide Vaccine 23-Valent Greater Than or Equal To 1yo Subcutaneous / IM    2. Encounter for immunization    - Pneumococcal Polysaccharide Vaccine 23-Valent Greater Than or Equal To 1yo Subcutaneous / IM    3. Essential hypertension  Controlled, continue same. Reviewed recent labs and WNL. Follow up 6 months. Discussed need to continue to watch sodium in diet, work on weight loss.     4. Mixed hyperlipidemia  Controlled, continue same. Follow up 6 months.

## 2019-01-21 RX ORDER — BLOOD SUGAR DIAGNOSTIC
STRIP MISCELLANEOUS
Refills: 0 | OUTPATIENT
Start: 2019-01-21

## 2019-04-10 DIAGNOSIS — J30.2 SEASONAL ALLERGIC RHINITIS: ICD-10-CM

## 2019-04-10 RX ORDER — CETIRIZINE HYDROCHLORIDE 10 MG/1
TABLET ORAL
Qty: 90 TABLET | Refills: 0 | Status: SHIPPED | OUTPATIENT
Start: 2019-04-10 | End: 2019-07-16 | Stop reason: SDUPTHER

## 2019-04-15 ENCOUNTER — LAB (OUTPATIENT)
Dept: ENDOCRINOLOGY | Age: 59
End: 2019-04-15

## 2019-04-15 DIAGNOSIS — E10.9 TYPE 1 DIABETES MELLITUS WITHOUT COMPLICATION (HCC): ICD-10-CM

## 2019-04-15 DIAGNOSIS — E78.2 MIXED HYPERLIPIDEMIA: Primary | ICD-10-CM

## 2019-04-15 DIAGNOSIS — E55.9 AVITAMINOSIS D: ICD-10-CM

## 2019-04-15 DIAGNOSIS — E78.2 MIXED HYPERLIPIDEMIA: ICD-10-CM

## 2019-04-16 LAB
25(OH)D3+25(OH)D2 SERPL-MCNC: 41.4 NG/ML (ref 30–100)
ALBUMIN SERPL-MCNC: 5 G/DL (ref 3.5–5.2)
ALBUMIN/GLOB SERPL: 1.5 G/DL
ALP SERPL-CCNC: 81 U/L (ref 39–117)
ALT SERPL-CCNC: 27 U/L (ref 1–41)
AST SERPL-CCNC: 26 U/L (ref 1–40)
BILIRUB SERPL-MCNC: 0.4 MG/DL (ref 0.2–1.2)
BUN SERPL-MCNC: 15 MG/DL (ref 6–20)
BUN/CREAT SERPL: 12.1 (ref 7–25)
C PEPTIDE SERPL-MCNC: 0.5 NG/ML (ref 1.1–4.4)
CALCIUM SERPL-MCNC: 10 MG/DL (ref 8.6–10.5)
CHLORIDE SERPL-SCNC: 99 MMOL/L (ref 98–107)
CHOLEST SERPL-MCNC: 151 MG/DL (ref 0–200)
CO2 SERPL-SCNC: 27.9 MMOL/L (ref 22–29)
CREAT SERPL-MCNC: 1.24 MG/DL (ref 0.76–1.27)
GLOBULIN SER CALC-MCNC: 3.3 GM/DL
GLUCOSE SERPL-MCNC: 177 MG/DL (ref 65–99)
HBA1C MFR BLD: 6.5 % (ref 4.8–5.6)
HDLC SERPL-MCNC: 57 MG/DL (ref 40–60)
INTERPRETATION: NORMAL
LDLC SERPL CALC-MCNC: 80 MG/DL (ref 0–100)
Lab: NORMAL
MICROALBUMIN UR-MCNC: 4.7 UG/ML
POTASSIUM SERPL-SCNC: 4 MMOL/L (ref 3.5–5.2)
PROT SERPL-MCNC: 8.3 G/DL (ref 6–8.5)
SODIUM SERPL-SCNC: 139 MMOL/L (ref 136–145)
TRIGL SERPL-MCNC: 71 MG/DL (ref 0–150)
VLDLC SERPL CALC-MCNC: 14.2 MG/DL

## 2019-04-22 ENCOUNTER — RESULTS ENCOUNTER (OUTPATIENT)
Dept: ENDOCRINOLOGY | Age: 59
End: 2019-04-22

## 2019-04-22 DIAGNOSIS — E10.9 TYPE 1 DIABETES MELLITUS WITHOUT COMPLICATION (HCC): ICD-10-CM

## 2019-04-22 DIAGNOSIS — E55.9 AVITAMINOSIS D: ICD-10-CM

## 2019-04-22 DIAGNOSIS — E78.2 MIXED HYPERLIPIDEMIA: ICD-10-CM

## 2019-04-22 DIAGNOSIS — I10 ESSENTIAL HYPERTENSION: ICD-10-CM

## 2019-04-23 DIAGNOSIS — E78.2 MIXED HYPERLIPIDEMIA: ICD-10-CM

## 2019-04-24 RX ORDER — SIMVASTATIN 20 MG
TABLET ORAL
Qty: 90 TABLET | Refills: 0 | OUTPATIENT
Start: 2019-04-24

## 2019-05-16 ENCOUNTER — OFFICE VISIT (OUTPATIENT)
Dept: ENDOCRINOLOGY | Age: 59
End: 2019-05-16

## 2019-05-16 VITALS
DIASTOLIC BLOOD PRESSURE: 74 MMHG | SYSTOLIC BLOOD PRESSURE: 124 MMHG | WEIGHT: 194 LBS | HEIGHT: 68 IN | BODY MASS INDEX: 29.4 KG/M2

## 2019-05-16 DIAGNOSIS — I10 ESSENTIAL HYPERTENSION: ICD-10-CM

## 2019-05-16 DIAGNOSIS — Z79.4 LONG-TERM INSULIN USE (HCC): ICD-10-CM

## 2019-05-16 DIAGNOSIS — Z46.81 COUNSELING FOR INSULIN PUMP: ICD-10-CM

## 2019-05-16 DIAGNOSIS — E10.9 TYPE 1 DIABETES MELLITUS WITHOUT COMPLICATION (HCC): Primary | ICD-10-CM

## 2019-05-16 DIAGNOSIS — E78.2 MIXED HYPERLIPIDEMIA: ICD-10-CM

## 2019-05-16 PROCEDURE — 95251 CONT GLUC MNTR ANALYSIS I&R: CPT | Performed by: NURSE PRACTITIONER

## 2019-05-16 PROCEDURE — 99214 OFFICE O/P EST MOD 30 MIN: CPT | Performed by: NURSE PRACTITIONER

## 2019-05-16 RX ORDER — HYDROCHLOROTHIAZIDE 25 MG/1
TABLET ORAL
Qty: 90 TABLET | Refills: 1 | Status: SHIPPED | OUTPATIENT
Start: 2019-05-16 | End: 2019-09-05 | Stop reason: SDUPTHER

## 2019-05-16 RX ORDER — SIMVASTATIN 20 MG
TABLET ORAL
Qty: 90 TABLET | Refills: 1 | Status: SHIPPED | OUTPATIENT
Start: 2019-05-16 | End: 2019-05-16

## 2019-05-16 RX ORDER — ROSUVASTATIN CALCIUM 40 MG/1
40 TABLET, COATED ORAL DAILY
Qty: 90 TABLET | Refills: 1 | Status: SHIPPED | OUTPATIENT
Start: 2019-05-16 | End: 2019-06-11 | Stop reason: SDUPTHER

## 2019-05-16 RX ORDER — LISINOPRIL 10 MG/1
TABLET ORAL
Qty: 90 TABLET | Refills: 1 | Status: SHIPPED | OUTPATIENT
Start: 2019-05-16 | End: 2019-09-05 | Stop reason: SDUPTHER

## 2019-05-16 NOTE — PROGRESS NOTES
Fred Nagel  presents to the office  for the follow-up appointment for Type 2 diabetes mellitus.     The diabete's condition location is throughout the system with the  clinical course has fluctuated, the severity is Mild, and the modifying/allievating factors are insulin pump.  Medications and  Dosages were  reviewed with Fred Nagel and suggested that compliance most of the time.    The patient reports associated symptoms of hyperglycemia have been none and associated symptoms of hypoglycemia have been hunger and weakness, with their  hypoglycemia threshold for symptoms is 60 mg/dl .     The patient is currently on insulin.    Compliance with blood glucose monitoring: good.     Meal panning: The patient is using carbohydrate counting, but is not on a specified limit, being a pump user.    The patient is currently taking home blood tests - Blood glucose testin times daily, that are:  before each meal and 1 or 2 hours after meal  fasting and bedtime  anytime you feel symptoms of hyperglycemia or hypoglycemia (high or low blood sugars)  Novolog U100 per [employer] 670G insulin pump. Patient is changing sites every 3 days.     Last instructions given were:  In summary I saw and examined this 58-year-old male for above-mentioned problems.  I reviewed his laboratory evaluation of 2018 and provided him with a hard copy of it.  Overall he is clinically and metabolically stable and therefore we will go ahead and continue all his current prescriptions.  I also introduced him to Ms. Nancy Polo of [employer] to arrange his training.  He will see Ms. Hilda Antonio in 4 months or sooner if needed with laboratory evaluation prior to each office visit.  This office visit lasted 45 minutes with the 25 minutes of it being spent on face-to-face patient counseling and education.    Home blood glucose testing daily: 8  insulin pump upload  -Yes patient's insulin pump and sensor was uploaded.  Generated date May 16,  2019 for the report dates of May 3 through May 16.  Time in range 73% of the time was 20% above 193 250 mg/Sid.  5% above 250 mg/Sid.  Auto mode exits sensor updating an auto mode disabled by the user.  Hypoglycemic patterns 5 noted.  Between 4:30 PM and 6:23 PM 3 occurrences, 9:35 AM through 10:10 AM 1 occurrence and 1:10 PM through 1:50 PM 1 occurrence.  Hyperglycemic patterns 1 noted between 8:20 PM and 10:50 PM.  Statistics shows auto mode is 90% of the time with 10% manual and 85% sensor.  Average glucose 154+ or -50 mg/Sid.  Self-monitoring blood glucose 177+ -61 mg/Sid  Total daily dose 27 units.  Basal is 56% with 15 units.  Active insulin time is 4 hours    Last reported blood glucose readings are:  None.    Patterns reported per patient are none.         The following portions of the patient's history were reviewed and updated as appropriate: current medications, past family history, past medical history, past social history, past surgical history and problem list.      Current Outpatient Medications:   •  aspirin 81 MG EC tablet, Take 81 mg by mouth Daily., Disp: , Rfl:   •  cetirizine (zyrTEC) 10 MG tablet, TAKE 1 TABLET DAILY (NEED APPOINTMENT FOR FURTHER REFILLS), Disp: 90 tablet, Rfl: 0  •  glucagon (GLUCAGON EMERGENCY) 1 MG injection, Inject 1 mg under the skin into the appropriate area as directed 1 (One) Time As Needed for Low Blood Sugar for up to 1 dose., Disp: 2 kit, Rfl: 2  •  hydrochlorothiazide (HYDRODIURIL) 25 MG tablet, 1 tablet daily by mouth, Disp: 90 tablet, Rfl: 1  •  Insulin Pen Needle 31G X 4 MM misc, 1 pen 4 (Four) Times a Day., Disp: 400 each, Rfl: 2  •  lisinopril (PRINIVIL,ZESTRIL) 10 MG tablet, 1 tablet daily by mouth, Disp: 90 tablet, Rfl: 1  •  Multiple Vitamins-Minerals (MULTIVITAMIN WITH MINERALS) tablet tablet, Take 1 tablet by mouth Daily., Disp: , Rfl:   •  insulin aspart (NOVOLOG) 100 UNIT/ML injection, average 50u daily via insulin pump, Disp: 60 mL, Rfl: 3  •   "rosuvastatin (CRESTOR) 40 MG tablet, Take 1 tablet by mouth Daily., Disp: 90 tablet, Rfl: 1    Patient Active Problem List    Diagnosis   • Counseling for insulin pump [Z46.81]   • Long-term insulin use (CMS/HCC) [Z79.4]   • Avitaminosis D [E55.9]   • Mixed hyperlipidemia [E78.2]   • Seasonal allergies [J30.2]   • Type 1 diabetes mellitus without complication (CMS/HCC) [E10.9]   • Hyperlipidemia [E78.5]   • Essential hypertension [I10]   • Benign brain tumor (CMS/HCC) [D33.2]       Review of Systems   A comprehensive review of the 14 systems was negative except of listed below:  Endocrine: hyperglycemia     Objective:     Wt Readings from Last 3 Encounters:   05/16/19 88 kg (194 lb)   01/11/19 90 kg (198 lb 6.4 oz)   01/02/19 90.4 kg (199 lb 6.4 oz)     Temp Readings from Last 3 Encounters:   01/11/19 97.5 °F (36.4 °C)   07/03/18 97.9 °F (36.6 °C)   06/29/18 97.6 °F (36.4 °C) (Oral)     BP Readings from Last 3 Encounters:   05/16/19 124/74   01/11/19 118/76   01/02/19 124/82     Pulse Readings from Last 3 Encounters:   01/11/19 105   07/03/18 71   06/29/18 82        /74   Ht 172.7 cm (67.99\")   Wt 88 kg (194 lb)   BMI 29.50 kg/m²        Physical Exam   Constitutional: He is oriented to person, place, and time. He appears well-developed and well-nourished. No distress.   HENT:   Head: Normocephalic and atraumatic.   Eyes: EOM are normal. Pupils are equal, round, and reactive to light.   Neck: Normal range of motion. Neck supple. No thyromegaly present.   Cardiovascular: Normal rate, regular rhythm, normal heart sounds and intact distal pulses.   No murmur heard.  Pulmonary/Chest: Effort normal and breath sounds normal.   Abdominal: Soft. Bowel sounds are normal.   Musculoskeletal: Normal range of motion.   Neurological: He is alert and oriented to person, place, and time.   Skin: Skin is warm and dry. Capillary refill takes 2 to 3 seconds. He is not diaphoretic.   Psychiatric: He has a normal mood and " affect. His behavior is normal. Judgment and thought content normal.   Nursing note and vitals reviewed.          Lab Review  Results for orders placed or performed in visit on 04/15/19   Vitamin D 25 Hydroxy   Result Value Ref Range    25 Hydroxy, Vitamin D 41.4 30.0 - 100.0 ng/ml   MicroAlbumin, Urine, Random - Urine, Clean Catch   Result Value Ref Range    Microalbumin, Urine 4.7 Not Estab. ug/mL   Hemoglobin A1c   Result Value Ref Range    Hemoglobin A1C 6.50 (H) 4.80 - 5.60 %   C-Peptide   Result Value Ref Range    C-Peptide 0.5 (L) 1.1 - 4.4 ng/mL   Comprehensive Metabolic Panel   Result Value Ref Range    Glucose 177 (H) 65 - 99 mg/dL    BUN 15 6 - 20 mg/dL    Creatinine 1.24 0.76 - 1.27 mg/dL    eGFR Non African Am 60 (L) >60 mL/min/1.73    eGFR African Am 73 >60 mL/min/1.73    BUN/Creatinine Ratio 12.1 7.0 - 25.0    Sodium 139 136 - 145 mmol/L    Potassium 4.0 3.5 - 5.2 mmol/L    Chloride 99 98 - 107 mmol/L    Total CO2 27.9 22.0 - 29.0 mmol/L    Calcium 10.0 8.6 - 10.5 mg/dL    Total Protein 8.3 6.0 - 8.5 g/dL    Albumin 5.00 3.50 - 5.20 g/dL    Globulin 3.3 gm/dL    A/G Ratio 1.5 g/dL    Total Bilirubin 0.4 0.2 - 1.2 mg/dL    Alkaline Phosphatase 81 39 - 117 U/L    AST (SGOT) 26 1 - 40 U/L    ALT (SGPT) 27 1 - 41 U/L   Lipid Panel   Result Value Ref Range    Total Cholesterol 151 0 - 200 mg/dL    Triglycerides 71 0 - 150 mg/dL    HDL Cholesterol 57 40 - 60 mg/dL    VLDL Cholesterol 14.2 mg/dL    LDL Cholesterol  80 0 - 100 mg/dL   Cardiovascular Risk Assessment   Result Value Ref Range    Interpretation Note    Diabetes Patient Education   Result Value Ref Range    PDF Image Not applicable            Assessment:   Fred was seen today for follow-up.    Diagnoses and all orders for this visit:    Type 1 diabetes mellitus without complication (CMS/[facility])  -     insulin aspart (NOVOLOG) 100 UNIT/ML injection; average 50u daily via insulin pump  -     Comprehensive Metabolic Panel; Future  -     Hemoglobin  A1c; Future  -     Lipid Panel; Future  -     Microalbumin / Creatinine Urine Ratio - Urine, Clean Catch; Future  -     Uric Acid; Future  -     Vitamin D 25 Hydroxy; Future  -     TSH; Future  -     Thyroid Antibodies; Future  -     T4, Free; Future  -     T3, Free; Future    Essential hypertension  -     lisinopril (PRINIVIL,ZESTRIL) 10 MG tablet; 1 tablet daily by mouth  -     hydrochlorothiazide (HYDRODIURIL) 25 MG tablet; 1 tablet daily by mouth  -     Comprehensive Metabolic Panel; Future    Mixed hyperlipidemia  -     Discontinue: simvastatin (ZOCOR) 20 MG tablet; Take 1 daily by mouth  -     rosuvastatin (CRESTOR) 40 MG tablet; Take 1 tablet by mouth Daily.  -     Comprehensive Metabolic Panel; Future    Long-term insulin use (CMS/HCC)  -     Comprehensive Metabolic Panel; Future    Counseling for insulin pump  -     Comprehensive Metabolic Panel; Future          Plan:   In summary/ Medication changes: I met with this patient is metabolically stable and doing well at this time.  Laboratory testing was reviewed dated 4.15.2019, discussed with questions and answers completed.  Discussed and formulate a treatment plan with patient, patient verbally stated understood all instructions.    1. Diagnoses and all orders for this visit:     Type 1 diabetes mellitus without complication (CMS/HCC), Long-term insulin use (CMS/[facility]), Counseling for insulin pump- chronic, uncontrolled.  Medication changes were as follows  Try temporary targets for activities.  Instructed to calibrate 4 times daily for 2 weeks and 3 times daily.  Carb ratios changed to breakfast 1 unit for 8 g  Lunch 1 unit for 7 g dinner 1 unit for 7 g  Basals change to 12 AM-0.65 units/h  Sensitivities to change it 1 unit for every 35 mg/Sid for target dose of 110 350 mg/Sid  Active insulin x3 hours   maximum bolus 25 units    Essential hypertension- chronic, stable no medication changes at this time. Refills prescribed. Future labs ordered for upcoming  appointment and assessment.      Mixed hyperlipidemia- chronic, stable no medication changes at this time. Refills prescribed. Future labs ordered for upcoming appointment and assessment.        Instructions:  home blood tests -  Blood glucose testing: 3 times daily, that are:  fasting- 1st thing in morning before eating or drinking  before each meal and 1 or 2 hours after meal  bedtime  anytime you feel symptoms of hyperglycemia or hypoglycemia (high or low blood sugars)        Education:  interpretation of lab results, blood sugar goals, complications of diabetes mellitus, hypoglycemia prevention and treatment, exercise, illness management, self-monitoring of blood glucose skills, nutrition, carbohydrate counting and site rotation        The total face to face time spent was  25 minutes  with additional education given: 14 minutes (greater than 50% of the total time) was spent with counseling and coordination of care on: SMBG with goals, Side effects profiles with medications, medication use and purposes,     Return in about 3 months (around 8/16/2019), or if symptoms worsen or fail to improve, for Recheck. 3 months with Hilda-2 weeks prior for labs       Dragon transcription disclaimer     Much of this encounter note is an electronic transcription/translation of spoken language to printed text. The electronic translation of spoken language may permit erroneous, or at times, nonsensical words or phrases to be inadvertently transcribed. Although I have reviewed the note for such errors, some may still exist.

## 2019-05-24 RX ORDER — LANCETS
EACH MISCELLANEOUS
Qty: 808 EACH | Refills: 1 | Status: SHIPPED | OUTPATIENT
Start: 2019-05-24 | End: 2019-05-29 | Stop reason: CLARIF

## 2019-05-29 RX ORDER — BLOOD-GLUCOSE METER
KIT MISCELLANEOUS
Qty: 300 EACH | Refills: 5 | Status: SHIPPED | OUTPATIENT
Start: 2019-05-29 | End: 2019-06-11 | Stop reason: SDUPTHER

## 2019-05-29 RX ORDER — LANCETS 28 GAUGE
EACH MISCELLANEOUS
Qty: 300 EACH | Refills: 5 | Status: SHIPPED | OUTPATIENT
Start: 2019-05-29 | End: 2019-06-11 | Stop reason: SDUPTHER

## 2019-05-29 RX ORDER — BLOOD-GLUCOSE METER
KIT MISCELLANEOUS
Qty: 1 EACH | Refills: 0 | Status: SHIPPED | OUTPATIENT
Start: 2019-05-29

## 2019-06-06 ENCOUNTER — TREATMENT (OUTPATIENT)
Dept: ENDOCRINOLOGY | Age: 59
End: 2019-06-06

## 2019-06-06 DIAGNOSIS — Z46.81 COUNSELING FOR INSULIN PUMP: ICD-10-CM

## 2019-06-06 DIAGNOSIS — Z79.4 LONG-TERM INSULIN USE (HCC): ICD-10-CM

## 2019-06-06 DIAGNOSIS — E10.9 TYPE 1 DIABETES MELLITUS WITHOUT COMPLICATION (HCC): Primary | ICD-10-CM

## 2019-06-06 PROCEDURE — 95251 CONT GLUC MNTR ANALYSIS I&R: CPT | Performed by: NURSE PRACTITIONER

## 2019-06-11 ENCOUNTER — TELEPHONE (OUTPATIENT)
Dept: ENDOCRINOLOGY | Age: 59
End: 2019-06-11

## 2019-06-11 DIAGNOSIS — E78.2 MIXED HYPERLIPIDEMIA: ICD-10-CM

## 2019-06-11 RX ORDER — LANCETS 28 GAUGE
EACH MISCELLANEOUS
Qty: 800 EACH | Refills: 2 | Status: SHIPPED | OUTPATIENT
Start: 2019-06-11 | End: 2020-01-13 | Stop reason: SDUPTHER

## 2019-06-11 RX ORDER — ROSUVASTATIN CALCIUM 40 MG/1
40 TABLET, COATED ORAL DAILY
Qty: 90 TABLET | Refills: 1 | Status: SHIPPED | OUTPATIENT
Start: 2019-06-11 | End: 2019-06-17 | Stop reason: SDUPTHER

## 2019-06-11 RX ORDER — BLOOD-GLUCOSE METER
KIT MISCELLANEOUS
Qty: 800 EACH | Refills: 2 | Status: SHIPPED | OUTPATIENT
Start: 2019-06-11 | End: 2020-01-13 | Stop reason: SDUPTHER

## 2019-06-11 NOTE — TELEPHONE ENCOUNTER
Patient called and stated that the RX for test strip to [facility] was a temporary supply until he could receive strips and supplies from [facility].  Please send 90 supply of test trips, [facility]s [facility] with [facility] year supply of refills.     Also patients doesn't have any simvastatin.  The dose was increased from 20 mg to 40 mg please send new [facility].       Please call patient and let him know when prescriptions have been sent to [facility] because this is his 3rd call.

## 2019-06-17 DIAGNOSIS — E78.2 MIXED HYPERLIPIDEMIA: ICD-10-CM

## 2019-06-17 RX ORDER — ROSUVASTATIN CALCIUM 40 MG/1
40 TABLET, COATED ORAL DAILY
Qty: 90 TABLET | Refills: 1 | Status: SHIPPED | OUTPATIENT
Start: 2019-06-17 | End: 2019-09-05 | Stop reason: SDUPTHER

## 2019-07-11 ENCOUNTER — OFFICE VISIT (OUTPATIENT)
Dept: INTERNAL MEDICINE | Age: 59
End: 2019-07-11

## 2019-07-11 VITALS
HEART RATE: 86 BPM | OXYGEN SATURATION: 98 % | BODY MASS INDEX: 29.13 KG/M2 | DIASTOLIC BLOOD PRESSURE: 78 MMHG | HEIGHT: 68 IN | WEIGHT: 192.2 LBS | TEMPERATURE: 97.2 F | SYSTOLIC BLOOD PRESSURE: 114 MMHG

## 2019-07-11 DIAGNOSIS — E10.9 TYPE 1 DIABETES MELLITUS WITHOUT COMPLICATION (HCC): Primary | ICD-10-CM

## 2019-07-11 DIAGNOSIS — I10 ESSENTIAL HYPERTENSION: ICD-10-CM

## 2019-07-11 DIAGNOSIS — E78.2 MIXED HYPERLIPIDEMIA: ICD-10-CM

## 2019-07-11 PROCEDURE — 99214 OFFICE O/P EST MOD 30 MIN: CPT | Performed by: NURSE PRACTITIONER

## 2019-07-11 NOTE — PROGRESS NOTES
[facility]  Liliana Ibarra Nagel / 58 y.o. / male  07/11/2019      ASSESSMENT & PLAN:    Problem List Items Addressed This Visit        Cardiovascular and Mediastinum    Essential hypertension    Relevant Medications    lisinopril (PRINIVIL,ZESTRIL) 10 MG tablet    hydrochlorothiazide (HYDRODIURIL) 25 MG tablet    Mixed hyperlipidemia    Relevant Medications    rosuvastatin (CRESTOR) 40 MG tablet       Endocrine    Type 1 diabetes mellitus without complication (CMS/[facility]) - Primary    Overview     Managed by endocrinology         Relevant Medications    glucagon (GLUCAGON EMERGENCY) 1 MG injection    insulin aspart (NOVOLOG) 100 UNIT/ML injection        No orders of the defined types were placed in this encounter.    No orders of the defined types were placed in this encounter.      Summary/Discussion:    1. Type 1 diabetes mellitus without complication (CMS/[facility])  Last hemoglobin A1c 6.5.  Patient currently on insulin pump with sensor managed by endocrinology.  Follow-up as scheduled with endocrinology in September.  Eye exam scheduled in the next couple of months.    2. Essential hypertension  Stable on current medication.  Continue same, follow-up hypertension recheck in 6 months.    3. Mixed hyperlipidemia  Stable on current medication.  Continue same, follow-up hyperlipidemia recheck in 6 months.         Return in about 6 months (around 1/11/2020) for Next scheduled follow up.    ____________________________________________________________________    MEDICATIONS  Current Outpatient Medications   Medication Sig Dispense Refill   • aspirin 81 MG EC tablet Take 81 mg by mouth Daily.     • Blood Glucose Monitoring Suppl (FREESTYLE LITE) device Use to test blood glucose 1 each 0   • cetirizine (zyrTEC) 10 MG tablet TAKE 1 TABLET DAILY (NEED APPOINTMENT FOR FURTHER REFILLS) 90 tablet 0   • FREESTYLE LITE test strip Test 8 times daily 800 each 2   • glucagon (GLUCAGON EMERGENCY) 1 MG  "injection Inject 1 mg under the skin into the appropriate area as directed 1 (One) Time As Needed for Low Blood Sugar for up to 1 dose. 2 kit 2   • hydrochlorothiazide (HYDRODIURIL) 25 MG tablet 1 tablet daily by mouth 90 tablet 1   • insulin aspart (NOVOLOG) 100 UNIT/ML injection average 50u daily via insulin pump 60 mL 3   • Insulin Pen Needle 31G X 4 MM misc 1 pen 4 (Four) Times a Day. 400 each 2   • Lancets (FREESTYLE) lancets Test 8 times daily 800 each 2   • lisinopril (PRINIVIL,ZESTRIL) 10 MG tablet 1 tablet daily by mouth 90 tablet 1   • Multiple Vitamins-Minerals (MULTIVITAMIN WITH MINERALS) tablet tablet Take 1 tablet by mouth Daily.     • rosuvastatin (CRESTOR) 40 MG tablet Take 1 tablet by mouth Daily. 90 tablet 1     No current facility-administered medications for this visit.           VITALS:    Visit Vitals  /78   Pulse 86   Temp 97.2 °F (36.2 °C)   Ht 172.7 cm (67.99\")   Wt 87.2 kg (192 lb 3.2 oz)   SpO2 98%   BMI 29.23 kg/m²       BP Readings from Last 3 Encounters:   07/11/19 114/78   05/16/19 124/74   01/11/19 118/76     Wt Readings from Last 3 Encounters:   07/11/19 87.2 kg (192 lb 3.2 oz)   05/16/19 88 kg (194 lb)   01/11/19 90 kg (198 lb 6.4 oz)      Body mass index is 29.23 kg/m².    CC:  Main reason(s) for today's visit: Hyperlipidemia (6 mth F/U ) and Diabetes      HPI: Patient here today for 6-month chronic medical follow-up for hyperlipidemia, diabetes, hypertension.     Hypertension: Patient here for follow-up of elevated blood pressure. He is exercising and is adherent to low salt diet. Cardiac symptoms none. Patient denies chest pain, dyspnea, exertional chest pressure/discomfort and lower extremity edema.  Cardiovascular risk factors: advanced age (older than 55 for men, 65 for women), diabetes mellitus, dyslipidemia, hypertension and male gender. Use of agents associated with hypertension: none. History of target organ damage: none.    Diabetes Mellitus Type II, Follow-up: " "Patient here for follow-up of Type 2 diabetes mellitus.  He is managed by endocrinology.     Known diabetic complications: none  Cardiovascular risk factors: advanced age (older than 55 for men, 65 for women), diabetes mellitus, dyslipidemia, hypertension and male gender  Current diabetic medications include insulin pump.     Eye exam current (within one year): yes  Weight trend: stable  Prior visit with dietician: no  Current diet: in general, a \"healthy\" diet      Current monitoring regimen: home blood tests - continuous sensor  Any episodes of hypoglycemia? no    Is He on ACE inhibitor or angiotensin II receptor blocker?   Yes     Hyperlipidemia: Currently on rosuvastatin 40 mg daily.  Last fasting lipid panel done in April, labs are within acceptable range.  Denies any adverse side effects to medication.       Patient Care Team:  Liliana Earl APRN as PCP - General (Internal Medicine)    ____________________________________________________________________    REVIEW OF SYSTEMS    Review of Systems   Constitutional: Negative for activity change, appetite change and unexpected weight change.   HENT: Negative for tinnitus.    Eyes: Negative for visual disturbance.   Respiratory: Negative for cough, chest tightness and shortness of breath.    Cardiovascular: Negative for chest pain, palpitations and leg swelling.   Endocrine: Negative for polydipsia, polyphagia and polyuria.   Neurological: Negative for dizziness, light-headedness and headaches.         PHYSICAL EXAMINATION    Physical Exam   Constitutional: He is oriented to person, place, and time. Vital signs are normal. He appears well-developed and well-nourished. He is cooperative. He does not appear ill. No distress.   Cardiovascular: Normal rate, regular rhythm, S1 normal, S2 normal and normal heart sounds.   No murmur heard.  Pulmonary/Chest: Effort normal and breath sounds normal. He has no decreased breath sounds. He has no wheezes. He has no rhonchi. " He has no rales.   Neurological: He is alert and oriented to person, place, and time.   Skin: Skin is warm, dry and intact.   Psychiatric: He has a normal mood and affect. His speech is normal and behavior is normal. Judgment and thought content normal. Cognition and memory are normal.   Nursing note and vitals reviewed.      REVIEWED DATA:    Labs:     Lab Results   Component Value Date     04/15/2019    K 4.0 04/15/2019    AST 26 04/15/2019    ALT 27 04/15/2019    BUN 15 04/15/2019    CREATININE 1.24 04/15/2019    CREATININE 1.17 12/19/2018    CREATININE 1.17 08/08/2018    EGFRIFNONA 60 (L) 04/15/2019    EGFRIFAFRI 73 04/15/2019       Lab Results   Component Value Date    HGBA1C 6.50 (H) 04/15/2019    HGBA1C 6.10 (H) 12/19/2018    HGBA1C 6.50 (H) 08/08/2018    GLUCOSE 107 (H) 06/29/2018    MICROALBUR 4.7 04/15/2019       Lab Results   Component Value Date    LDL 80 04/15/2019    LDL 73 12/19/2018    LDL 71 08/08/2018    HDL 57 04/15/2019    HDL 61 (H) 12/19/2018    HDL 55 08/08/2018    TRIG 71 04/15/2019    TRIG 53 12/19/2018    TRIG 43 08/08/2018    CHOLHDLRATIO 2.28 08/03/2017       Lab Results   Component Value Date    TSH 1.490 12/19/2018    FREET4 1.11 08/08/2018       Lab Results   Component Value Date    WBC 7.94 06/29/2018    HGB 13.1 (L) 06/29/2018    HGB 13.4 (L) 08/03/2017     06/29/2018       Lab Results   Component Value Date    PROTEIN Negative 08/03/2017    GLUCOSEU Negative 08/03/2017    BLOODU Negative 08/03/2017    NITRITEU Negative 08/03/2017    LEUKOCYTESUR Negative 08/03/2017       Imaging:         Medical Tests:         Summary of old records / correspondence / consultant report:         Request outside records:         ALLERGIES  No Known Allergies     PFSH:     The following portions of the patient's history were reviewed and updated as appropriate: Allergies / Current Medications / Past Medical History / Surgical History / Social History / Family History    PROBLEM LIST    Patient Active Problem List   Diagnosis   • Type 1 diabetes mellitus without complication (CMS/HCC)   • Hyperlipidemia   • Essential hypertension   • Benign brain tumor (CMS/HCC)   • Seasonal allergies   • Mixed hyperlipidemia   • Avitaminosis D   • Counseling for insulin pump   • Long-term insulin use (CMS/HCC)       PAST MEDICAL HISTORY  Past Medical History:   Diagnosis Date   • Allergic    • Benign brain tumor (CMS/HCC)    • Diabetes mellitus (CMS/HCC)     Type 1 Diabetes   • DKA, type 1 (CMS/HCC)    • Hyperlipidemia    • Hypertension        SURGICAL HISTORY  Past Surgical History:   Procedure Laterality Date   • ACHILLES TENDON REPAIR         SOCIAL HISTORY  Social History     Socioeconomic History   • Marital status:      Spouse name: Not on file   • Number of children: Not on file   • Years of education: Not on file   • Highest education level: Not on file   Occupational History   • Occupation: retired Contractor Copilot    Tobacco Use   • Smoking status: Former Smoker     Years: 20.     Last attempt to quit:      Years since quittin.5   • Smokeless tobacco: Never Used   Substance and Sexual Activity   • Alcohol use: No     Comment: quit    • Drug use: No   • Sexual activity: Yes     Partners: Female     Comment:  (wife)        FAMILY HISTORY  Family History   Problem Relation Age of Onset   • Cancer Mother    • Cancer Father    • Hypertension Sister    • Hypertension Brother          **Dragon Disclaimer:   Much of this encounter note is an electronic transcription/translation of spoken language to printed text. The electronic translation of spoken language may permit erroneous, or at times, nonsensical words or phrases to be inadvertently transcribed. Although I have reviewed the note for such errors, some may still exist.

## 2019-07-16 DIAGNOSIS — J30.2 SEASONAL ALLERGIC RHINITIS: ICD-10-CM

## 2019-07-16 RX ORDER — CETIRIZINE HYDROCHLORIDE 10 MG/1
TABLET ORAL
Qty: 90 TABLET | Refills: 0 | Status: SHIPPED | OUTPATIENT
Start: 2019-07-16 | End: 2019-10-14 | Stop reason: SDUPTHER

## 2019-07-22 NOTE — PROGRESS NOTES
Fred Nagel date of birth September 3rd 1960    generate a report June 6th for the report dates of May 24th through June 6, 2019    patterns benighted hypoglycemic patterns for noted with hyperglycemic one.    time in range 80% with 17% 190 through 250 ml for dekaliter and 2% 250 through 400    automotive is 98% of the time with manual mode 2% sensor where 94% with an average glucose of 146 + -43 is 4 dekaliter  average glucose 169 + -53 mg / Deca with self-monitoring blood glucose 9.8% with calibration 4.5    Total daily dose is 40 units with bolus at 45% and basil at 55% active insulin time is 3 hours    meal evaluation post breakfast 127, lunch 141, dinner 139 there are no overnight testing to be done    At this time patient is following all instructions accurately maintaining his diabetes control there will be no insulin pump changes.

## 2019-08-20 ENCOUNTER — RESULTS ENCOUNTER (OUTPATIENT)
Dept: ENDOCRINOLOGY | Age: 59
End: 2019-08-20

## 2019-08-20 DIAGNOSIS — E10.9 TYPE 1 DIABETES MELLITUS WITHOUT COMPLICATION (HCC): ICD-10-CM

## 2019-08-20 DIAGNOSIS — Z46.81 COUNSELING FOR INSULIN PUMP: ICD-10-CM

## 2019-08-20 DIAGNOSIS — E78.2 MIXED HYPERLIPIDEMIA: ICD-10-CM

## 2019-08-20 DIAGNOSIS — I10 ESSENTIAL HYPERTENSION: ICD-10-CM

## 2019-08-20 DIAGNOSIS — Z79.4 LONG-TERM INSULIN USE (HCC): ICD-10-CM

## 2019-08-23 LAB
25(OH)D3+25(OH)D2 SERPL-MCNC: 38.9 NG/ML (ref 30–100)
ALBUMIN SERPL-MCNC: 4.5 G/DL (ref 3.5–5.2)
ALBUMIN/CREAT UR: <4.1 MG/G CREAT (ref 0–30)
ALBUMIN/GLOB SERPL: 1.5 G/DL
ALP SERPL-CCNC: 71 U/L (ref 39–117)
ALT SERPL-CCNC: 30 U/L (ref 1–41)
AST SERPL-CCNC: 24 U/L (ref 1–40)
BILIRUB SERPL-MCNC: 0.5 MG/DL (ref 0.2–1.2)
BUN SERPL-MCNC: 12 MG/DL (ref 6–20)
BUN/CREAT SERPL: 10.3 (ref 7–25)
CALCIUM SERPL-MCNC: 9.4 MG/DL (ref 8.6–10.5)
CHLORIDE SERPL-SCNC: 103 MMOL/L (ref 98–107)
CHOLEST SERPL-MCNC: 103 MG/DL (ref 0–200)
CO2 SERPL-SCNC: 30.6 MMOL/L (ref 22–29)
CREAT SERPL-MCNC: 1.17 MG/DL (ref 0.76–1.27)
CREAT UR-MCNC: 72.9 MG/DL
GLOBULIN SER CALC-MCNC: 3 GM/DL
GLUCOSE SERPL-MCNC: 116 MG/DL (ref 65–99)
HBA1C MFR BLD: 7.8 % (ref 4.8–5.6)
HDLC SERPL-MCNC: 53 MG/DL (ref 40–60)
INTERPRETATION: NORMAL
LDLC SERPL CALC-MCNC: 37 MG/DL (ref 0–100)
Lab: NORMAL
MICROALBUMIN UR-MCNC: <3 UG/ML
POTASSIUM SERPL-SCNC: 3.9 MMOL/L (ref 3.5–5.2)
PROT SERPL-MCNC: 7.5 G/DL (ref 6–8.5)
SODIUM SERPL-SCNC: 143 MMOL/L (ref 136–145)
T3FREE SERPL-MCNC: 3 PG/ML (ref 2–4.4)
T4 FREE SERPL-MCNC: 1.08 NG/DL (ref 0.93–1.7)
THYROGLOB AB SERPL-ACNC: <1 IU/ML (ref 0–0.9)
THYROPEROXIDASE AB SERPL-ACNC: 34 IU/ML (ref 0–34)
TRIGL SERPL-MCNC: 64 MG/DL (ref 0–150)
TSH SERPL DL<=0.005 MIU/L-ACNC: 1.39 MIU/ML (ref 0.27–4.2)
URATE SERPL-MCNC: 3.2 MG/DL (ref 3.4–7)
VLDLC SERPL CALC-MCNC: 12.8 MG/DL

## 2019-09-05 ENCOUNTER — OFFICE VISIT (OUTPATIENT)
Dept: ENDOCRINOLOGY | Age: 59
End: 2019-09-05

## 2019-09-05 VITALS
SYSTOLIC BLOOD PRESSURE: 122 MMHG | HEIGHT: 68 IN | DIASTOLIC BLOOD PRESSURE: 80 MMHG | WEIGHT: 204.6 LBS | BODY MASS INDEX: 31.01 KG/M2

## 2019-09-05 DIAGNOSIS — E78.2 MIXED HYPERLIPIDEMIA: ICD-10-CM

## 2019-09-05 DIAGNOSIS — Z46.81 COUNSELING FOR INSULIN PUMP: ICD-10-CM

## 2019-09-05 DIAGNOSIS — E10.9 TYPE 1 DIABETES MELLITUS WITHOUT COMPLICATION (HCC): Primary | ICD-10-CM

## 2019-09-05 DIAGNOSIS — I10 ESSENTIAL HYPERTENSION: ICD-10-CM

## 2019-09-05 DIAGNOSIS — Z79.4 LONG-TERM INSULIN USE (HCC): ICD-10-CM

## 2019-09-05 PROCEDURE — 95251 CONT GLUC MNTR ANALYSIS I&R: CPT | Performed by: NURSE PRACTITIONER

## 2019-09-05 PROCEDURE — 99214 OFFICE O/P EST MOD 30 MIN: CPT | Performed by: NURSE PRACTITIONER

## 2019-09-05 RX ORDER — HYDROCHLOROTHIAZIDE 25 MG/1
TABLET ORAL
Qty: 90 TABLET | Refills: 1 | Status: SHIPPED | OUTPATIENT
Start: 2019-09-05 | End: 2020-01-13 | Stop reason: SDUPTHER

## 2019-09-05 RX ORDER — ROSUVASTATIN CALCIUM 40 MG/1
40 TABLET, COATED ORAL DAILY
Qty: 90 TABLET | Refills: 1 | Status: SHIPPED | OUTPATIENT
Start: 2019-09-05 | End: 2020-01-13 | Stop reason: SDUPTHER

## 2019-09-05 RX ORDER — LISINOPRIL 10 MG/1
TABLET ORAL
Qty: 90 TABLET | Refills: 1 | Status: SHIPPED | OUTPATIENT
Start: 2019-09-05 | End: 2020-01-13 | Stop reason: SDUPTHER

## 2019-09-05 NOTE — PROGRESS NOTES
Fred Nagel  presents to the office  for the follow-up appointment for Type 2 diabetes mellitus.     The diabete's condition location is throughout the system with the  clinical course has fluctuated, the severity is Mild, and the modifying/allievating factors are insulin pump.  Medications and  Dosages were  reviewed with Fred Nagel and suggested that compliance most of the time.    The patient reports associated symptoms of hyperglycemia have been none and associated symptoms of hypoglycemia have been hunger and weakness, with their  hypoglycemia threshold for symptoms is 60 mg/dl .     The patient is currently on insulin.     Compliance with blood glucose monitoring: good.     Meal panning: The patient is using carbohydrate counting, but is not on a specified limit, being a pump user.    The patient is currently taking home blood tests - Blood glucose testin times daily, that are:  before each meal and 1 or 2 hours after meal  fasting and bedtime  anytime you feel symptoms of hyperglycemia or hypoglycemia (high or low blood sugars)  Novolog U100 per insulin pump     Last instructions given were:  Type 1 diabetes mellitus without complication (CMS/[facility]), Long-term insulin use (CMS/[facility]), Counseling for insulin pump- chronic, uncontrolled.  Medication changes were as follows  Try temporary targets for activities.  Instructed to calibrate 4 times daily for 2 weeks and 3 times daily.  Carb ratios changed to breakfast 1 unit for 8 g  Lunch 1 unit for 7 g dinner 1 unit for 7 g  Basals change to 12 AM-0.65 units/h  Sensitivities to change it 1 unit for every 35 mg/Sid for target dose of 110 350 mg/Sid  Active insulin x3 hours   maximum bolus 25 units     Essential hypertension- chronic, stable no medication changes at this time. Refills prescribed. Future labs ordered for upcoming appointment and assessment.      Mixed hyperlipidemia- chronic, stable no medication changes at this time. Refills prescribed.  Future labs ordered for upcoming appointment and assessment.     Home blood glucose testing daily: 8  insulin pump upload  -Yes-generator report September 5, 2019 for the report dates of August 23 through September 5.  Hypoglycemic patterns 5 to be noted with a total of 4 occurrences and hyperglycemic patterns 2.  Time in range 73% with 18% above 193 216 8% between 216 through 400 g/Sid.  Auto mode exits due to patient habits there was 9 due to high glucose exits not taking full boluses or not addressing alarms no blood glucose values sensor updating  Auto mode 97% of the time with manual mode 3% since aware 97 with an average blood glucose of 157+ or -58 mg/Sid.  Average blood glucose 200+ or -83 mg/Sid blood glucose checks 10 with calibrating 3 total daily dose is 52 units active time is 3 hours.    Last reported blood glucose readings are:  Home blood glucose testing daily: 8  insulin pump upload  -Yes patient's insulin pump and sensor was uploaded.  Generated date May 16, 2019 for the report dates of May 3 through May 16.  Time in range 73% of the time was 20% above 193 250 mg/Sid.  5% above 250 mg/Sid.  Auto mode exits sensor updating an auto mode disabled by the user.  Hypoglycemic patterns 5 noted.  Between 4:30 PM and 6:23 PM 3 occurrences, 9:35 AM through 10:10 AM 1 occurrence and 1:10 PM through 1:50 PM 1 occurrence.  Hyperglycemic patterns 1 noted between 8:20 PM and 10:50 PM.  Statistics shows auto mode is 90% of the time with 10% manual and 85% sensor.  Average glucose 154+ or -50 mg/Sid.  Self-monitoring blood glucose 177+ -61 mg/Sid  Total daily dose 27 units.  Basal is 56% with 15 units.  Active insulin time is 4 hours    Patterns reported per patient are none.         The following portions of the patient's history were reviewed and updated as appropriate: current medications, past family history, past medical history, past social history, past surgical history and problem list.      Current  Outpatient Medications:   •  aspirin 81 MG EC tablet, Take 81 mg by mouth Daily., Disp: , Rfl:   •  Blood Glucose Monitoring Suppl (FREESTYLE LITE) device, Use to test blood glucose, Disp: 1 each, Rfl: 0  •  cetirizine (zyrTEC) 10 MG tablet, TAKE 1 TABLET DAILY (NEED APPOINTMENT FOR FURTHER REFILLS), Disp: 90 tablet, Rfl: 0  •  FREESTYLE LITE test strip, Test 8 times daily, Disp: 800 each, Rfl: 2  •  glucagon (GLUCAGON EMERGENCY) 1 MG injection, Inject 1 mg under the skin into the appropriate area as directed 1 (One) Time As Needed for Low Blood Sugar for up to 1 dose., Disp: 2 kit, Rfl: 2  •  hydrochlorothiazide (HYDRODIURIL) 25 MG tablet, 1 tablet daily by mouth, Disp: 90 tablet, Rfl: 1  •  insulin aspart (NOVOLOG) 100 UNIT/ML injection, average 50u daily via insulin pump, Disp: 60 mL, Rfl: 3  •  Insulin Pen Needle 31G X 4 MM misc, 1 pen 4 (Four) Times a Day., Disp: 400 each, Rfl: 2  •  Lancets (FREESTYLE) lancets, Test 8 times daily, Disp: 800 each, Rfl: 2  •  lisinopril (PRINIVIL,ZESTRIL) 10 MG tablet, 1 tablet daily by mouth, Disp: 90 tablet, Rfl: 1  •  Multiple Vitamins-Minerals (MULTIVITAMIN WITH MINERALS) tablet tablet, Take 1 tablet by mouth Daily., Disp: , Rfl:   •  rosuvastatin (CRESTOR) 40 MG tablet, Take 1 tablet by mouth Daily., Disp: 90 tablet, Rfl: 1    Patient Active Problem List    Diagnosis   • Counseling for insulin pump [Z46.81]   • Long-term insulin use (CMS/HCC) [Z79.4]   • Avitaminosis D [E55.9]   • Mixed hyperlipidemia [E78.2]   • Seasonal allergies [J30.2]   • Type 1 diabetes mellitus without complication (CMS/HCC) [E10.9]   • Hyperlipidemia [E78.5]   • Essential hypertension [I10]   • Benign brain tumor (CMS/HCC) [D33.2]       Review of Systems   A comprehensive review of the 14 systems was negative except of listed below:  Endocrine: hypoglycemia  hyperglycemia     Objective:     Wt Readings from Last 3 Encounters:   09/05/19 92.8 kg (204 lb 9.6 oz)   07/11/19 87.2 kg (192 lb 3.2 oz)  "  05/16/19 88 kg (194 lb)     Temp Readings from Last 3 Encounters:   07/11/19 97.2 °F (36.2 °C)   01/11/19 97.5 °F (36.4 °C)   07/03/18 97.9 °F (36.6 °C)     BP Readings from Last 3 Encounters:   09/05/19 122/80   07/11/19 114/78   05/16/19 124/74     Pulse Readings from Last 3 Encounters:   07/11/19 86   01/11/19 105   07/03/18 71        /80   Ht 172.7 cm (67.99\")   Wt 92.8 kg (204 lb 9.6 oz)   BMI 31.12 kg/m²        Physical Exam   Constitutional: He is oriented to person, place, and time. He appears well-developed and well-nourished. No distress.   HENT:   Head: Normocephalic and atraumatic.   Eyes: EOM are normal. Pupils are equal, round, and reactive to light.   Neck: Normal range of motion. Neck supple. No thyromegaly present.   Cardiovascular: Normal rate, regular rhythm, normal heart sounds and intact distal pulses.   No murmur heard.  Pulmonary/Chest: Effort normal and breath sounds normal.   Abdominal: Soft. Bowel sounds are normal.   Musculoskeletal: Normal range of motion.   Neurological: He is alert and oriented to person, place, and time.   Skin: Skin is warm and dry. Capillary refill takes 2 to 3 seconds. He is not diaphoretic.   Psychiatric: He has a normal mood and affect. His behavior is normal. Judgment and thought content normal.   Nursing note and vitals reviewed.          Lab Review  Results for orders placed or performed in visit on 08/20/19   Comprehensive Metabolic Panel   Result Value Ref Range    Glucose 116 (H) 65 - 99 mg/dL    BUN 12 6 - 20 mg/dL    Creatinine 1.17 0.76 - 1.27 mg/dL    eGFR Non African Am 64 >60 mL/min/1.73    eGFR African Am 78 >60 mL/min/1.73    BUN/Creatinine Ratio 10.3 7.0 - 25.0    Sodium 143 136 - 145 mmol/L    Potassium 3.9 3.5 - 5.2 mmol/L    Chloride 103 98 - 107 mmol/L    Total CO2 30.6 (H) 22.0 - 29.0 mmol/L    Calcium 9.4 8.6 - 10.5 mg/dL    Total Protein 7.5 6.0 - 8.5 g/dL    Albumin 4.50 3.50 - 5.20 g/dL    Globulin 3.0 gm/dL    A/G Ratio 1.5 g/dL "    Total Bilirubin 0.5 0.2 - 1.2 mg/dL    Alkaline Phosphatase 71 39 - 117 U/L    AST (SGOT) 24 1 - 40 U/L    ALT (SGPT) 30 1 - 41 U/L   Hemoglobin A1c   Result Value Ref Range    Hemoglobin A1C 7.80 (H) 4.80 - 5.60 %   Lipid Panel   Result Value Ref Range    Total Cholesterol 103 0 - 200 mg/dL    Triglycerides 64 0 - 150 mg/dL    HDL Cholesterol 53 40 - 60 mg/dL    VLDL Cholesterol 12.8 mg/dL    LDL Cholesterol  37 0 - 100 mg/dL   Microalbumin / Creatinine Urine Ratio - Urine, Clean Catch   Result Value Ref Range    Creatinine, Urine 72.9 Not Estab. mg/dL    Microalbumin, Urine <3.0 Not Estab. ug/mL    Microalbumin/Creatinine Ratio <4.1 0.0 - 30.0 mg/g creat   Uric Acid   Result Value Ref Range    Uric Acid 3.2 (L) 3.4 - 7.0 mg/dL   Vitamin D 25 Hydroxy   Result Value Ref Range    25 Hydroxy, Vitamin D 38.9 30.0 - 100.0 ng/ml   TSH   Result Value Ref Range    TSH 1.390 0.270 - 4.200 mIU/mL   Thyroid Antibodies   Result Value Ref Range    Thyroid Peroxidase Antibody 34 0 - 34 IU/mL    Thyroglobulin Ab <1.0 0.0 - 0.9 IU/mL   T4, Free   Result Value Ref Range    Free T4 1.08 0.93 - 1.70 ng/dL   T3, Free   Result Value Ref Range    T3, Free 3.0 2.0 - 4.4 pg/mL   Cardiovascular Risk Assessment   Result Value Ref Range    Interpretation Note    Diabetes Patient Education   Result Value Ref Range    PDF Image Not applicable            Assessment:   Fred was seen today for follow-up.    Diagnoses and all orders for this visit:    Type 1 diabetes mellitus without complication (CMS/HCC)  -     insulin aspart (NOVOLOG) 100 UNIT/ML injection; average 50u daily via insulin pump  -     Fructosamine; Future  -     Comprehensive Metabolic Panel; Future  -     Hemoglobin A1c; Future  -     Lipid Panel; Future  -     Microalbumin / Creatinine Urine Ratio - Urine, Clean Catch; Future  -     Uric Acid; Future  -     Vitamin D 25 Hydroxy; Future  -     TSH; Future  -     Thyroid Antibodies; Future  -     T4, Free; Future  -     T3,  Free; Future    Mixed hyperlipidemia  -     rosuvastatin (CRESTOR) 40 MG tablet; Take 1 tablet by mouth Daily.  -     Comprehensive Metabolic Panel; Future  -     Lipid Panel; Future    Essential hypertension  -     lisinopril (PRINIVIL,ZESTRIL) 10 MG tablet; 1 tablet daily by mouth  -     hydrochlorothiazide (HYDRODIURIL) 25 MG tablet; 1 tablet daily by mouth  -     Comprehensive Metabolic Panel; Future    Long-term insulin use (CMS/HCC)  -     Comprehensive Metabolic Panel; Future    Counseling for insulin pump  -     Comprehensive Metabolic Panel; Future          Plan:   In summary/ Medication changes: I met with this patient is metabolically stable and doing well at this time.  Laboratory testing was reviewed dated 8.20.2019, discussed with questions and answers completed.  Discussed and formulate a treatment plan with patient, patient verbally stated understood all instructions.    1. Diagnoses and all orders for this visit:      Type 1 diabetes mellitus without complication (CMS/HCC)- chronic, uncontrolled.  Medication changes were as follows  Calibrate 4 times a day for 1 week and then return to 3 he is to pay attention to his alarms, input all of his carbohydrates,  Carb ratios breakfast 1 unit for every 10 g  Lunch 1 unit for 6 g and dinner 1 unit for 6 g           Mixed hyperlipidemia- chronic, stable no medication changes at this time. Refills prescribed. Future labs ordered for upcoming appointment and assessment.        Essential hypertension= chronic, stable no medication changes at this time. Refills prescribed. Future labs ordered for upcoming appointment and assessment.       Long-term insulin use (CMS/HCC)        Counseling for insulin pump           Instructions:  home blood tests -  Blood glucose testing: 3 times daily, that are:        Education:  interpretation of lab results, blood sugar goals, complications of diabetes mellitus, hypoglycemia prevention and treatment, exercise, illness  management, self-monitoring of blood glucose skills, nutrition, carbohydrate counting, site rotation, use of insulin pen, insulin adjustments, self-injection of insulin, use of sliding scale/correction formula and use of insulin: carb ratio        The total face to face time spent was  25 minutes  with additional education given: 14 minutes (greater than 50% of the total time) was spent with counseling and coordination of care on: SMBG with goals, Side effects profiles with medications, medication use and purposes,     Return in about 3 months (around 12/5/2019), or if symptoms worsen or fail to improve, for Recheck. 3 months with Hilda-2 weeks prior for labs 6 months with Dr. King-2 weeks prior for labs      Dragon transcription disclaimer     Much of this encounter note is an electronic transcription/translation of spoken language to printed text. The electronic translation of spoken language may permit erroneous, or at times, nonsensical words or phrases to be inadvertently transcribed. Although I have reviewed the note for such errors, some may still exist.

## 2019-10-14 DIAGNOSIS — J30.2 SEASONAL ALLERGIC RHINITIS: ICD-10-CM

## 2019-10-14 RX ORDER — CETIRIZINE HYDROCHLORIDE 10 MG/1
TABLET ORAL
Qty: 90 TABLET | Refills: 0 | Status: SHIPPED | OUTPATIENT
Start: 2019-10-14 | End: 2020-01-13

## 2019-10-30 ENCOUNTER — HOSPITAL ENCOUNTER (OUTPATIENT)
Dept: GENERAL RADIOLOGY | Facility: HOSPITAL | Age: 59
Discharge: HOME | End: 2019-10-30
Admitting: NURSE PRACTITIONER

## 2019-10-30 ENCOUNTER — OFFICE VISIT (OUTPATIENT)
Dept: INTERNAL MEDICINE | Age: 59
End: 2019-10-30

## 2019-10-30 VITALS
DIASTOLIC BLOOD PRESSURE: 98 MMHG | HEIGHT: 68 IN | TEMPERATURE: 97.1 F | BODY MASS INDEX: 30.77 KG/M2 | HEART RATE: 56 BPM | WEIGHT: 203 LBS | OXYGEN SATURATION: 97 % | SYSTOLIC BLOOD PRESSURE: 130 MMHG

## 2019-10-30 DIAGNOSIS — M25.552 ACUTE PAIN OF LEFT HIP: Primary | ICD-10-CM

## 2019-10-30 PROCEDURE — 96372 THER/PROPH/DIAG INJ SC/IM: CPT | Performed by: NURSE PRACTITIONER

## 2019-10-30 PROCEDURE — 99214 OFFICE O/P EST MOD 30 MIN: CPT | Performed by: NURSE PRACTITIONER

## 2019-10-30 PROCEDURE — 73502 X-RAY EXAM HIP UNI 2-3 VIEWS: CPT

## 2019-10-30 RX ORDER — METHYLPREDNISOLONE ACETATE 80 MG/ML
80 INJECTION, SUSPENSION INTRA-ARTICULAR; INTRALESIONAL; INTRAMUSCULAR; SOFT TISSUE ONCE
Status: COMPLETED | OUTPATIENT
Start: 2019-10-30 | End: 2019-10-30

## 2019-10-30 RX ADMIN — METHYLPREDNISOLONE ACETATE 80 MG: 80 INJECTION, SUSPENSION INTRA-ARTICULAR; INTRALESIONAL; INTRAMUSCULAR; SOFT TISSUE at 10:32

## 2019-10-30 NOTE — PROGRESS NOTES
"Fred Nagel / 59 y.o. / male  Encounter Date: 10/30/2019    ASSESSMENT & PLAN:    Problem List Items Addressed This Visit     None      Visit Diagnoses     Acute pain of left hip    -  Primary    Relevant Medications    methylPREDNISolone acetate (DEPO-medrol) injection 80 mg (Completed)    Other Relevant Orders    XR Hip With or Without Pelvis 2 - 3 View Left        Orders Placed This Encounter   Procedures   • XR Hip With or Without Pelvis 2 - 3 View Left     New Medications Ordered This Visit   Medications   • methylPREDNISolone acetate (DEPO-medrol) injection 80 mg       Summary/Discussion:  1. Acute pain of left hip  - Steroid injection for acute pain, advised patient of the effect on blood glucose and to anticipate supplemental insulin needs (he has manual pump). Start NSAID therapy at home, along with rest, heat/ice as needed and gentle mobility as tolerated. Advised him that pending xray results we will likely recommend physical therapy after acute pain has improved.     - XR Hip With or Without Pelvis 2 - 3 View Left  - methylPREDNISolone acetate (DEPO-medrol) injection 80 mg      Return if symptoms worsen or fail to improve, for Next scheduled follow up.  ________________________________________________________________    VITALS:    Visit Vitals  /98   Pulse 56   Temp 97.1 °F (36.2 °C) (Temporal)   Ht 172.7 cm (67.99\")   Wt 92.1 kg (203 lb)   SpO2 97%   BMI 30.87 kg/m²       BP Readings from Last 3 Encounters:   10/30/19 130/98   09/05/19 122/80   07/11/19 114/78     Wt Readings from Last 3 Encounters:   10/30/19 92.1 kg (203 lb)   09/05/19 92.8 kg (204 lb 9.6 oz)   07/11/19 87.2 kg (192 lb 3.2 oz)      Body mass index is 30.87 kg/m².    CC: Main reason(s) for today's visit: Hip Pain (ledt )      HPI    Patient is a 59 y.o. male who is here for L hip pain started suddenly yesterday morning, when he first got out of bed. No trauma, injury or overuse injuries reported. He denies any new or different " activities in the days prior to pain onset. Pain is described as 8/10 when acute, and severe with moving, but improves with sitting or laying down to rest. Pain in located in the posterior L hip area. Exacerbated with resisted abduction. He has not tried any OTC medications or heat/ice for the pain.     Patient Care Team:  Liliana Earl APRN as PCP - General (Internal Medicine)  ____________________________________________________________________    REVIEW OF SYSTEMS    Review of Systems  As noted per HPI  Constitutional neg  Resp neg  CV neg     PHYSICAL EXAMINATION    Physical Exam   Musculoskeletal:        Lumbar back: He exhibits tenderness and spasm. He exhibits normal range of motion, no swelling and no edema.        Back:    Area of palpable severe tenderness. No erythema, swelling or ecchymosis noted.      Constitutional  No distress  Cardiovascular Rate  normal . Rhythm: regular . Heart sounds:  normal  Pulmonary/Chest  Effort normal. Breath sounds:  normal  Psychiatric  Alert. Judgment and thought content normal. Mood normal      REVIEWED DATA:    Labs:   Lab Results   Component Value Date     08/22/2019    K 3.9 08/22/2019    AST 24 08/22/2019    ALT 30 08/22/2019    BUN 12 08/22/2019    CREATININE 1.17 08/22/2019    CREATININE 1.24 04/15/2019    CREATININE 1.17 12/19/2018    EGFRIFNONA 64 08/22/2019    EGFRIFAFRI 78 08/22/2019       Lab Results   Component Value Date    GLUCOSE 107 (H) 06/29/2018    HGBA1C 7.80 (H) 08/22/2019    HGBA1C 6.50 (H) 04/15/2019    HGBA1C 6.10 (H) 12/19/2018    MICROALBUR <3.0 08/22/2019       Lab Results   Component Value Date    LDL 37 08/22/2019    LDL 80 04/15/2019    LDL 73 12/19/2018    HDL 53 08/22/2019    TRIG 64 08/22/2019    CHOLHDLRATIO 2.28 08/03/2017       Lab Results   Component Value Date    TSH 1.390 08/22/2019    FREET4 1.08 08/22/2019          Lab Results   Component Value Date    WBC 7.94 06/29/2018    HGB 13.1 (L) 06/29/2018    HGB 13.4 (L)  08/03/2017     06/29/2018         Imaging:      Medical Tests:      Summary of old records / correspondence / consultant report:      Request outside records:   ______________________________________________________________________    ALLERGIES  No Known Allergies     MEDICATIONS  Current Outpatient Medications on File Prior to Visit   Medication Sig   • aspirin 81 MG EC tablet Take 81 mg by mouth Daily.   • Blood Glucose Monitoring Suppl (FREESTYLE LITE) device Use to test blood glucose   • cetirizine (zyrTEC) 10 MG tablet TAKE 1 TABLET DAILY (NEED APPOINTMENT FOR FURTHER REFILLS)   • FREESTYLE LITE test strip Test 8 times daily   • glucagon (GLUCAGON EMERGENCY) 1 MG injection Inject 1 mg under the skin into the appropriate area as directed 1 (One) Time As Needed for Low Blood Sugar for up to 1 dose.   • hydrochlorothiazide (HYDRODIURIL) 25 MG tablet 1 tablet daily by mouth   • insulin aspart (NOVOLOG) 100 UNIT/ML injection average 50u daily via insulin pump   • Insulin Pen Needle 31G X 4 MM misc 1 pen 4 (Four) Times a Day.   • Lancets (FREESTYLE) lancets Test 8 times daily   • lisinopril (PRINIVIL,ZESTRIL) 10 MG tablet 1 tablet daily by mouth   • Multiple Vitamins-Minerals (MULTIVITAMIN WITH MINERALS) tablet tablet Take 1 tablet by mouth Daily.   • rosuvastatin (CRESTOR) 40 MG tablet Take 1 tablet by mouth Daily.     No current facility-administered medications on file prior to visit.        PFSH:     The following portions of the patient's history were reviewed and updated as appropriate: Allergies / Current Medications / Past Medical History / Surgical History / Social History / Family History    PROBLEM LIST   Patient Active Problem List   Diagnosis   • Type 1 diabetes mellitus without complication (CMS/HCC)   • Hyperlipidemia   • Essential hypertension   • Benign brain tumor (CMS/HCC)   • Seasonal allergies   • Mixed hyperlipidemia   • Avitaminosis D   • Counseling for insulin pump   • Long-term insulin  use (CMS/HCC)       PAST MEDICAL HISTORY  Past Medical History:   Diagnosis Date   • Allergic    • Benign brain tumor (CMS/HCC)    • Diabetes mellitus (CMS/HCC) 2012    Type 1 Diabetes   • DKA, type 1 (CMS/HCC)    • Hyperlipidemia    • Hypertension        SURGICAL HISTORY  Past Surgical History:   Procedure Laterality Date   • ACHILLES TENDON REPAIR         SOCIAL HISTORY  Social History     Socioeconomic History   • Marital status:      Spouse name: Not on file   • Number of children: Not on file   • Years of education: Not on file   • Highest education level: Not on file   Occupational History   • Occupation: retired [employer]    Tobacco Use   • Smoking status: Former Smoker     Years: 20.00     Last attempt to quit:      Years since quittin.8   • Smokeless tobacco: Never Used   Substance and Sexual Activity   • Alcohol use: No     Comment: quit    • Drug use: No   • Sexual activity: Yes     Partners: Female     Comment:  (wife)        FAMILY HISTORY  Family History   Problem Relation Age of Onset   • Cancer Mother    • Cancer Father    • Hypertension Sister    • Hypertension Brother

## 2019-10-31 ENCOUNTER — TELEPHONE (OUTPATIENT)
Dept: INTERNAL MEDICINE | Age: 59
End: 2019-10-31

## 2019-10-31 DIAGNOSIS — M25.552 HIP JOINT PAINFUL ON MOVEMENT, LEFT: ICD-10-CM

## 2019-10-31 DIAGNOSIS — S76.012A MUSCLE STRAIN OF LEFT HIP, INITIAL ENCOUNTER: Primary | ICD-10-CM

## 2019-10-31 RX ORDER — CYCLOBENZAPRINE HCL 5 MG
5 TABLET ORAL 3 TIMES DAILY PRN
Qty: 20 TABLET | Refills: 0 | Status: SHIPPED | OUTPATIENT
Start: 2019-10-31

## 2019-12-05 ENCOUNTER — RESULTS ENCOUNTER (OUTPATIENT)
Dept: ENDOCRINOLOGY | Age: 59
End: 2019-12-05

## 2019-12-05 DIAGNOSIS — E10.9 TYPE 1 DIABETES MELLITUS WITHOUT COMPLICATION (HCC): ICD-10-CM

## 2019-12-05 DIAGNOSIS — E78.2 MIXED HYPERLIPIDEMIA: ICD-10-CM

## 2019-12-05 DIAGNOSIS — Z46.81 COUNSELING FOR INSULIN PUMP: ICD-10-CM

## 2019-12-05 DIAGNOSIS — I10 ESSENTIAL HYPERTENSION: ICD-10-CM

## 2019-12-05 DIAGNOSIS — Z79.4 LONG-TERM INSULIN USE (HCC): ICD-10-CM

## 2020-01-13 ENCOUNTER — OFFICE VISIT (OUTPATIENT)
Dept: INTERNAL MEDICINE | Age: 60
End: 2020-01-13

## 2020-01-13 VITALS
HEIGHT: 68 IN | TEMPERATURE: 97.2 F | SYSTOLIC BLOOD PRESSURE: 128 MMHG | OXYGEN SATURATION: 98 % | HEART RATE: 82 BPM | BODY MASS INDEX: 31.58 KG/M2 | WEIGHT: 208.4 LBS | DIASTOLIC BLOOD PRESSURE: 84 MMHG

## 2020-01-13 DIAGNOSIS — I10 ESSENTIAL HYPERTENSION: Primary | ICD-10-CM

## 2020-01-13 DIAGNOSIS — J30.2 SEASONAL ALLERGIC RHINITIS: ICD-10-CM

## 2020-01-13 DIAGNOSIS — E78.2 MIXED HYPERLIPIDEMIA: ICD-10-CM

## 2020-01-13 DIAGNOSIS — E10.9 TYPE 1 DIABETES MELLITUS WITHOUT COMPLICATION (HCC): ICD-10-CM

## 2020-01-13 PROCEDURE — 99214 OFFICE O/P EST MOD 30 MIN: CPT | Performed by: NURSE PRACTITIONER

## 2020-01-13 RX ORDER — LISINOPRIL 10 MG/1
TABLET ORAL
Qty: 90 TABLET | Refills: 1 | Status: SHIPPED | OUTPATIENT
Start: 2020-01-13 | End: 2020-10-20 | Stop reason: SDUPTHER

## 2020-01-13 RX ORDER — HYDROCHLOROTHIAZIDE 25 MG/1
TABLET ORAL
Qty: 90 TABLET | Refills: 1 | Status: SHIPPED | OUTPATIENT
Start: 2020-01-13 | End: 2020-10-20 | Stop reason: SDUPTHER

## 2020-01-13 RX ORDER — BLOOD-GLUCOSE METER
KIT MISCELLANEOUS
Qty: 800 EACH | Refills: 2 | Status: SHIPPED | OUTPATIENT
Start: 2020-01-13 | End: 2020-01-29 | Stop reason: SDUPTHER

## 2020-01-13 RX ORDER — ROSUVASTATIN CALCIUM 40 MG/1
40 TABLET, COATED ORAL DAILY
Qty: 90 TABLET | Refills: 1 | Status: SHIPPED | OUTPATIENT
Start: 2020-01-13 | End: 2020-01-29 | Stop reason: SDUPTHER

## 2020-01-13 RX ORDER — LANCETS 28 GAUGE
EACH MISCELLANEOUS
Qty: 800 EACH | Refills: 2 | Status: SHIPPED | OUTPATIENT
Start: 2020-01-13 | End: 2020-01-29 | Stop reason: SDUPTHER

## 2020-01-13 RX ORDER — CETIRIZINE HYDROCHLORIDE 10 MG/1
TABLET ORAL
Qty: 90 TABLET | Refills: 4 | Status: SHIPPED | OUTPATIENT
Start: 2020-01-13 | End: 2021-04-07

## 2020-01-13 NOTE — PROGRESS NOTES
[facility] [place] INTERNAL MEDICINE  Liliana Ibarra Nagel / 59 y.o. / male  2020      ASSESSMENT & PLAN:    Problem List Items Addressed This Visit        Cardiovascular and Mediastinum    Hyperlipidemia    Relevant Medications    rosuvastatin (CRESTOR) 40 MG tablet    Essential hypertension - Primary    Relevant Medications    lisinopril (PRINIVIL,ZESTRIL) 10 MG tablet    hydroCHLOROthiazide (HYDRODIURIL) 25 MG tablet       Endocrine    Type 1 diabetes mellitus without complication (CMS/[facility])    Overview     Managed by endocrinology         Relevant Medications    glucagon (GLUCAGON EMERGENCY) 1 MG injection    insulin aspart (NOVOLOG) 100 UNIT/ML injection    FREESTYLE LITE test strip    Insulin Pen Needle 31G X 4 MM misc    Lancets (FREESTYLE) lancets        No orders of the defined types were placed in this encounter.    New Medications Ordered This Visit   Medications   • rosuvastatin (CRESTOR) 40 MG tablet     Sig: Take 1 tablet by mouth Daily.     Dispense:  90 tablet     Refill:  1   • lisinopril (PRINIVIL,ZESTRIL) 10 MG tablet     Si tablet daily by mouth     Dispense:  90 tablet     Refill:  1   • insulin aspart (NOVOLOG) 100 UNIT/ML injection     Sig: average 50u daily via insulin pump     Dispense:  60 mL     Refill:  3   • hydroCHLOROthiazide (HYDRODIURIL) 25 MG tablet     Si tablet daily by mouth     Dispense:  90 tablet     Refill:  1   • FREESTYLE LITE test strip     Sig: Test 8 times daily     Dispense:  800 each     Refill:  2   • Insulin Pen Needle 31G X 4 MM misc     Si pen 4 (Four) Times a Day.     Dispense:  400 each     Refill:  2   • Lancets (FREESTYLE) lancets     Sig: Test 8 times daily     Dispense:  800 each     Refill:  2       Summary/Discussion:    1. Essential hypertension  Blood pressure stable on current therapy, continue same.  Check labs today and adjust dosage of medication as necessary.  Patient to establish care with PCP/endocrinology in [place].  Provided patient with 6 months' worth refills today on all medications.   Continue lifestyle modifications for high blood pressure, high cholesterol, and increased weight. Decrease/eliminate soda, caffeine, alcohol and overall caloric intake. Reduce carbohydrates and sweets in diet.  Continue to improve dietary habits with lean proteins, fresh vegetables, fruits, and nuts. Improve aerobic exercise: walking/biking/swimming daily as tolerated, recommend 30 minutes/day at least 5 days/week.     - lisinopril (PRINIVIL,ZESTRIL) 10 MG tablet; 1 tablet daily by mouth  Dispense: 90 tablet; Refill: 1  - hydroCHLOROthiazide (HYDRODIURIL) 25 MG tablet; 1 tablet daily by mouth  Dispense: 90 tablet; Refill: 1    2. Mixed hyperlipidemia    - rosuvastatin (CRESTOR) 40 MG tablet; Take 1 tablet by mouth Daily.  Dispense: 90 tablet; Refill: 1    3. Type 1 diabetes mellitus without complication (CMS/[facility])  Seeing endocrinologist later this month.  Will provide patient with refills for 6 months of medications until he establishes care with a new endocrinologist and PCP in [place].    - insulin aspart (NOVOLOG) 100 UNIT/ML injection; average 50u daily via insulin pump  Dispense: 60 mL; Refill: 3  - FREESTYLE LITE test strip; Test 8 times daily  Dispense: 800 each; Refill: 2  - Insulin Pen Needle 31G X 4 MM misc; 1 pen 4 (Four) Times a Day.  Dispense: 400 each; Refill: 2  - Lancets (FREESTYLE) lancets; Test 8 times daily  Dispense: 800 each; Refill: 2          No follow-ups on file.  ____________________________________________________________________    MEDICATIONS  Current Outpatient Medications   Medication Sig Dispense Refill   • aspirin 81 MG EC tablet Take 81 mg by mouth Daily.     • Blood Glucose Monitoring Suppl (FREESTYLE LITE) device Use to test blood glucose 1 each 0   • cetirizine (zyrTEC) 10 MG tablet TAKE 1 TABLET DAILY (NEED APPOINTMENT FOR FURTHER REFILLS) 90 tablet 0   • cyclobenzaprine (FLEXERIL) 5 MG tablet Take  "1 tablet by mouth 3 (Three) Times a Day As Needed for Muscle Spasms. 20 tablet 0   • FREESTYLE LITE test strip Test 8 times daily 800 each 2   • glucagon (GLUCAGON EMERGENCY) 1 MG injection Inject 1 mg under the skin into the appropriate area as directed 1 (One) Time As Needed for Low Blood Sugar for up to 1 dose. 2 kit 2   • hydroCHLOROthiazide (HYDRODIURIL) 25 MG tablet 1 tablet daily by mouth 90 tablet 1   • insulin aspart (NOVOLOG) 100 UNIT/ML injection average 50u daily via insulin pump 60 mL 3   • Insulin Pen Needle 31G X 4 MM misc 1 pen 4 (Four) Times a Day. 400 each 2   • Lancets (FREESTYLE) lancets Test 8 times daily 800 each 2   • lisinopril (PRINIVIL,ZESTRIL) 10 MG tablet 1 tablet daily by mouth 90 tablet 1   • Multiple Vitamins-Minerals (MULTIVITAMIN WITH MINERALS) tablet tablet Take 1 tablet by mouth Daily.     • rosuvastatin (CRESTOR) 40 MG tablet Take 1 tablet by mouth Daily. 90 tablet 1     No current facility-administered medications for this visit.        VITALS    Visit Vitals  /84   Pulse 82   Temp 97.2 °F (36.2 °C)   Ht 172.7 cm (67.99\")   Wt 94.5 kg (208 lb 6.4 oz)   SpO2 98%   BMI 31.69 kg/m²       BP Readings from Last 3 Encounters:   01/13/20 128/84   10/30/19 130/98   09/05/19 122/80     Wt Readings from Last 3 Encounters:   01/13/20 94.5 kg (208 lb 6.4 oz)   10/30/19 92.1 kg (203 lb)   09/05/19 92.8 kg (204 lb 9.6 oz)      Body mass index is 31.69 kg/m².    CC:  Main reason(s) for today's visit: Follow-up for hypertension and hyperlipidemia    HPI: Patient here for 6-month chronic medical follow-up.  He has notified me today that he will be moving out of state in the next couple of months, requesting refills on medications until he establishes care with a new primary [facility].       Chronic essential hypertension:  Since prior visit: compliant with medication(s), checks blood pressure regularly and denies significant problems with medication(s). Currently taking " hydrochlorothiazide (HCTZ) and lisinopril (Prinivil). He is exercising, is adherent to low sodium diet. BP readings at home are stable. He denies symptoms of chest pain/pressure, dyspnea, swelling, vision impairment. CVD risk factors include: advanced age (>55 for males, >65 for females), dyslipidemia, HTN, obesity (BMI>=30 kg/m2), and sedentary lifestyle. Use of agents associated with HTN: no tobacco use . Most recent in-office blood pressure readings:   BP Readings from Last 3 Encounters:   01/13/20 128/84   10/30/19 130/98   09/05/19 122/80       Chronic hyperlipidemia:  Current therapy include rosuvastatin.    Most recent labs:   Lab Results   Component Value Date    LDL 37 08/22/2019    LDL 80 04/15/2019    LDL 73 12/19/2018    HDL 53 08/22/2019    HDL 57 04/15/2019    HDL 61 (H) 12/19/2018    TRIG 64 08/22/2019    CHOLHDLRATIO 2.28 08/03/2017        Diabetes Mellitus Type II, Follow-up: Patient here for follow-up of Type 2 diabetes mellitus.  He is currently managed by endocrinology.   Known diabetic complications: none  Cardiovascular risk factors: advanced age (older than 55 for men, 65 for women), diabetes mellitus, dyslipidemia, hypertension, male gender and obesity (BMI >= 30 kg/m2)  Current diabetic medications include intensive insulin injection program.     Eye exam current (within one year): yes  Weight trend: stable    Current monitoring regimen: home blood tests - daily  Any episodes of hypoglycemia? no    Is He on ACE inhibitor or angiotensin II receptor blocker?   Yes             Patient Care Team:  Liliana Earl APRN as PCP - General (Internal Medicine)  ____________________________________________________________________      REVIEW OF SYSTEMS    Review of Systems   Constitutional: Negative for activity change, appetite change and unexpected weight change.   HENT: Negative for tinnitus.    Eyes: Negative for visual disturbance.   Respiratory: Negative for cough, chest tightness and shortness  of breath.    Cardiovascular: Negative for chest pain, palpitations and leg swelling.   Endocrine: Negative for polydipsia, polyphagia and polyuria.   Neurological: Negative for dizziness, light-headedness, numbness and headaches.         PHYSICAL EXAMINATION    Physical Exam   Constitutional: He is oriented to person, place, and time. Vital signs are normal. He appears well-developed and well-nourished. He is cooperative. He does not appear ill. No distress.   Cardiovascular: Normal rate, regular rhythm, S1 normal, S2 normal and normal heart sounds.   No murmur heard.  Pulmonary/Chest: Effort normal and breath sounds normal. He has no decreased breath sounds. He has no wheezes. He has no rhonchi. He has no rales.   Neurological: He is alert and oriented to person, place, and time.   Skin: Skin is warm, dry and intact.   Psychiatric: He has a normal mood and affect. His speech is normal and behavior is normal. Judgment and thought content normal. Cognition and memory are normal.   Nursing note and vitals reviewed.      REVIEWED DATA:    Labs:   Lab Results   Component Value Date     08/22/2019    K 3.9 08/22/2019    AST 24 08/22/2019    ALT 30 08/22/2019    BUN 12 08/22/2019    CREATININE 1.17 08/22/2019    CREATININE 1.24 04/15/2019    CREATININE 1.17 12/19/2018    EGFRIFNONA 64 08/22/2019    EGFRIFAFRI 78 08/22/2019       Lab Results   Component Value Date    HGBA1C 7.80 (H) 08/22/2019    HGBA1C 6.50 (H) 04/15/2019    HGBA1C 6.10 (H) 12/19/2018    GLUCOSE 107 (H) 06/29/2018    MICROALBUR <3.0 08/22/2019       Lab Results   Component Value Date    LDL 37 08/22/2019    LDL 80 04/15/2019    LDL 73 12/19/2018    HDL 53 08/22/2019    HDL 57 04/15/2019    HDL 61 (H) 12/19/2018    TRIG 64 08/22/2019    TRIG 71 04/15/2019    TRIG 53 12/19/2018    CHOLHDLRATIO 2.28 08/03/2017       Lab Results   Component Value Date    TSH 1.390 08/22/2019    FREET4 1.08 08/22/2019          Lab Results   Component Value Date    WBC  7.94 2018    HGB 13.1 (L) 2018    HGB 13.4 (L) 2017     2018       Lab Results   Component Value Date    PROTEIN Negative 2017    GLUCOSEU Negative 2017    BLOODU Negative 2017    NITRITEU Negative 2017    LEUKOCYTESUR Negative 2017       Imaging:        Medical Tests:        Summary of old records / correspondence / consultant report:        Request outside records:        ALLERGIES  No Known Allergies     PFSH:     The following portions of the patient's history were reviewed and updated as appropriate: Allergies / Current Medications / Past Medical History / Surgical History / Social History / Family History    PROBLEM LIST   Patient Active Problem List   Diagnosis   • Type 1 diabetes mellitus without complication (CMS/HCC)   • Hyperlipidemia   • Essential hypertension   • Benign brain tumor (CMS/HCC)   • Seasonal allergies   • Mixed hyperlipidemia   • Avitaminosis D   • Counseling for insulin pump   • Long-term insulin use (CMS/HCC)       PAST MEDICAL HISTORY  Past Medical History:   Diagnosis Date   • Allergic    • Benign brain tumor (CMS/HCC)    • Diabetes mellitus (CMS/HCC) 2012    Type 1 Diabetes   • DKA, type 1 (CMS/HCC)    • Hyperlipidemia    • Hypertension        SURGICAL HISTORY  Past Surgical History:   Procedure Laterality Date   • ACHILLES TENDON REPAIR         SOCIAL HISTORY  Social History     Socioeconomic History   • Marital status:      Spouse name: Not on file   • Number of children: Not on file   • Years of education: Not on file   • Highest education level: Not on file   Occupational History   • Occupation: retired     Tobacco Use   • Smoking status: Former Smoker     Years: 20.00     Last attempt to quit:      Years since quittin.0   • Smokeless tobacco: Never Used   Substance and Sexual Activity   • Alcohol use: No     Comment: quit    • Drug use: No   • Sexual activity: Yes     Partners: Female      Comment:  (wife)        FAMILY HISTORY  Family History   Problem Relation Age of Onset   • Cancer Mother    • Cancer Father    • Hypertension Sister    • Hypertension Brother

## 2020-01-23 LAB
25(OH)D3+25(OH)D2 SERPL-MCNC: 37.3 NG/ML (ref 30–100)
ALBUMIN SERPL-MCNC: 4.5 G/DL (ref 3.5–5.2)
ALBUMIN/GLOB SERPL: 1.7 G/DL
ALP SERPL-CCNC: 73 U/L (ref 39–117)
ALT SERPL-CCNC: 42 U/L (ref 1–41)
AST SERPL-CCNC: 27 U/L (ref 1–40)
BILIRUB SERPL-MCNC: 0.4 MG/DL (ref 0.2–1.2)
BUN SERPL-MCNC: 10 MG/DL (ref 6–20)
BUN/CREAT SERPL: 9.2 (ref 7–25)
CALCIUM SERPL-MCNC: 9.6 MG/DL (ref 8.6–10.5)
CHLORIDE SERPL-SCNC: 100 MMOL/L (ref 98–107)
CHOLEST SERPL-MCNC: 103 MG/DL (ref 0–200)
CO2 SERPL-SCNC: 28.8 MMOL/L (ref 22–29)
CREAT SERPL-MCNC: 1.09 MG/DL (ref 0.76–1.27)
GLOBULIN SER CALC-MCNC: 2.7 GM/DL
GLUCOSE SERPL-MCNC: 148 MG/DL (ref 65–99)
HBA1C MFR BLD: 8.7 % (ref 4.8–5.6)
HDLC SERPL-MCNC: 58 MG/DL (ref 40–60)
INTERPRETATION: NORMAL
LDLC SERPL CALC-MCNC: 35 MG/DL (ref 0–100)
Lab: NORMAL
MICROALBUMIN UR-MCNC: 5.5 UG/ML
POTASSIUM SERPL-SCNC: 4.3 MMOL/L (ref 3.5–5.2)
PROT SERPL-MCNC: 7.2 G/DL (ref 6–8.5)
SODIUM SERPL-SCNC: 140 MMOL/L (ref 136–145)
T4 SERPL-MCNC: 6.22 MCG/DL (ref 4.5–11.7)
TRIGL SERPL-MCNC: 50 MG/DL (ref 0–150)
TSH SERPL DL<=0.005 MIU/L-ACNC: 1.49 UIU/ML (ref 0.27–4.2)
URATE SERPL-MCNC: 2.9 MG/DL (ref 3.4–7)
VLDLC SERPL CALC-MCNC: 10 MG/DL

## 2020-01-29 ENCOUNTER — OFFICE VISIT (OUTPATIENT)
Dept: ENDOCRINOLOGY | Age: 60
End: 2020-01-29

## 2020-01-29 VITALS
DIASTOLIC BLOOD PRESSURE: 78 MMHG | SYSTOLIC BLOOD PRESSURE: 126 MMHG | WEIGHT: 206.4 LBS | RESPIRATION RATE: 16 BRPM | HEIGHT: 68 IN | BODY MASS INDEX: 31.28 KG/M2

## 2020-01-29 DIAGNOSIS — Z79.4 LONG-TERM INSULIN USE (HCC): ICD-10-CM

## 2020-01-29 DIAGNOSIS — Z46.81 COUNSELING FOR INSULIN PUMP: ICD-10-CM

## 2020-01-29 DIAGNOSIS — I10 ESSENTIAL HYPERTENSION: ICD-10-CM

## 2020-01-29 DIAGNOSIS — E78.2 MIXED HYPERLIPIDEMIA: ICD-10-CM

## 2020-01-29 DIAGNOSIS — E55.9 AVITAMINOSIS D: ICD-10-CM

## 2020-01-29 DIAGNOSIS — E10.9 TYPE 1 DIABETES MELLITUS WITHOUT COMPLICATION (HCC): Primary | ICD-10-CM

## 2020-01-29 PROCEDURE — 99214 OFFICE O/P EST MOD 30 MIN: CPT | Performed by: INTERNAL MEDICINE

## 2020-01-29 RX ORDER — ROSUVASTATIN CALCIUM 40 MG/1
40 TABLET, COATED ORAL DAILY
Qty: 90 TABLET | Refills: 3 | Status: SHIPPED | OUTPATIENT
Start: 2020-01-29 | End: 2020-07-01 | Stop reason: SDUPTHER

## 2020-01-29 RX ORDER — LANCETS 28 GAUGE
EACH MISCELLANEOUS
Qty: 800 EACH | Refills: 2 | Status: SHIPPED | OUTPATIENT
Start: 2020-01-29

## 2020-01-29 RX ORDER — BLOOD-GLUCOSE METER
KIT MISCELLANEOUS
Qty: 800 EACH | Refills: 2 | Status: SHIPPED | OUTPATIENT
Start: 2020-01-29

## 2020-01-29 NOTE — PROGRESS NOTES
Subjective   Fred Nagel is a 59 y.o. male seen for follow up for DM1, hyperlipidemia, HTN, lab review. Patient is currently using an insulin pump and changing pump sites every 3 days. He is checking BG 8 times a day. He denies any problems or concerns.     History of Present Illness this is a 59-year-old gentleman known patient with type 1 diabetes hypertension and dyslipidemia as well as vitamin D deficiency.  He is currently on insulin pump and over the course of last 6 months has had no significant health problem for which to go to the emergency room or hospital.  He is in a state of flux as he is getting things ready to move to [place] in [facility] to be with his whole family.  As a result he has not been taking good care of himself and he expected to have a high hemoglobin A1c.  No Known Allergies    Current Outpatient Medications:   •  aspirin 81 MG EC tablet, Take 81 mg by mouth Daily., Disp: , Rfl:   •  Blood Glucose Monitoring Suppl (FREESTYLE LITE) device, Use to test blood glucose, Disp: 1 each, Rfl: 0  •  cetirizine (zyrTEC) 10 MG tablet, TAKE 1 TABLET DAILY (NEED APPOINTMENT FOR FURTHER REFILLS), Disp: 90 tablet, Rfl: 4  •  cyclobenzaprine (FLEXERIL) 5 MG tablet, Take 1 tablet by mouth 3 (Three) Times a Day As Needed for Muscle Spasms., Disp: 20 tablet, Rfl: 0  •  FREESTYLE LITE test strip, Test 8 times daily, Disp: 800 each, Rfl: 2  •  glucagon (GLUCAGON EMERGENCY) 1 MG injection, Inject 1 mg under the skin into the appropriate area as directed 1 (One) Time As Needed for Low Blood Sugar for up to 1 dose., Disp: 2 kit, Rfl: 2  •  hydroCHLOROthiazide (HYDRODIURIL) 25 MG tablet, 1 tablet daily by mouth, Disp: 90 tablet, Rfl: 1  •  insulin aspart (NOVOLOG) 100 UNIT/ML injection, average 50u daily via insulin pump, Disp: 60 mL, Rfl: 3  •  Insulin Pen Needle 31G X 4 MM misc, 1 pen 4 (Four) Times a Day., Disp: 400 each, Rfl: 2  •  Lancets (FREESTYLE) lancets, Test 8 times daily, Disp: 800  each, Rfl: 2  •  lisinopril (PRINIVIL,ZESTRIL) 10 MG tablet, 1 tablet daily by mouth, Disp: 90 tablet, Rfl: 1  •  Multiple Vitamins-Minerals (MULTIVITAMIN WITH MINERALS) tablet tablet, Take 1 tablet by mouth Daily., Disp: , Rfl:   •  rosuvastatin (CRESTOR) 40 MG tablet, Take 1 tablet by mouth Daily., Disp: 90 tablet, Rfl: 1    The following portions of the patient's history were reviewed and updated as appropriate: allergies, current medications, past family history, past medical history, past social history, past surgical history and problem list.    Review of Systems   Constitutional: Negative.    HENT: Negative.    Eyes: Negative.    Respiratory: Negative.    Cardiovascular: Negative.    Gastrointestinal: Negative.    Endocrine: Negative.    Genitourinary: Negative.    Musculoskeletal: Negative.    Skin: Negative.    Allergic/Immunologic: Negative.    Neurological: Negative.    Hematological: Negative.    Psychiatric/Behavioral: Negative.    The above-mentioned review of system was reviewed, corroborated and accepted.    Objective   Physical Exam   Constitutional: He is oriented to person, place, and time. Vital signs are normal. He appears well-developed and well-nourished. He is cooperative. He does not appear ill. No distress.   HENT:   Head: Normocephalic and atraumatic.   Right Ear: External ear normal.   Left Ear: External ear normal.   Nose: Nose normal.   Mouth/Throat: Oropharynx is clear and moist. No oropharyngeal exudate.   Eyes: Pupils are equal, round, and reactive to light. Conjunctivae and EOM are normal. Right eye exhibits no discharge. Left eye exhibits no discharge. No scleral icterus.   Neck: Normal range of motion. Neck supple. No JVD present. No tracheal deviation present. No thyromegaly present.   Cardiovascular: Normal rate, regular rhythm, S1 normal, S2 normal, normal heart sounds and intact distal pulses. Exam reveals no gallop and no friction rub.   No murmur heard.  Pulmonary/Chest:  Effort normal and breath sounds normal. No stridor. No respiratory distress. He has no decreased breath sounds. He has no wheezes. He has no rhonchi. He has no rales. He exhibits no tenderness.   Abdominal: Soft. Bowel sounds are normal. He exhibits no distension and no mass. There is no tenderness. There is no rebound and no guarding. No hernia.   Musculoskeletal: Normal range of motion. He exhibits no edema, tenderness or deformity.   Lymphadenopathy:     He has no cervical adenopathy.   Neurological: He is alert and oriented to person, place, and time. He displays normal reflexes. No cranial nerve deficit or sensory deficit. He exhibits normal muscle tone. Coordination normal.   Skin: Skin is warm, dry and intact. No rash noted. He is not diaphoretic. No erythema. No pallor.   Psychiatric: He has a normal mood and affect. His speech is normal and behavior is normal. Judgment and thought content normal. Cognition and memory are normal.   Nursing note and vitals reviewed.  Significant change since 1/2/2019 office visit.  Lab Results   Component Value Date    GLUCOSE 107 (H) 06/29/2018    BUN 10 01/22/2020    CREATININE 1.09 01/22/2020    EGFRIFNONA 69 01/22/2020    EGFRIFAFRI 84 01/22/2020    BCR 9.2 01/22/2020    K 4.3 01/22/2020    CO2 28.8 01/22/2020    CALCIUM 9.6 01/22/2020    PROTENTOTREF 7.2 01/22/2020    ALBUMIN 4.50 01/22/2020    LABIL2 1.7 01/22/2020    AST 27 01/22/2020    ALT 42 (H) 01/22/2020     Lab Results   Component Value Date    HGBA1C 8.70 (H) 01/22/2020     Lab Results   Component Value Date    TSH 1.490 01/22/2020     Lab Results   Component Value Date    CHLPL 103 01/22/2020    CHLPL 103 08/22/2019    CHLPL 151 04/15/2019     Lab Results   Component Value Date    TRIG 50 01/22/2020    TRIG 64 08/22/2019    TRIG 71 04/15/2019     Lab Results   Component Value Date    HDL 58 01/22/2020    HDL 53 08/22/2019    HDL 57 04/15/2019     Lab Results   Component Value Date    LDL 35 01/22/2020    LDL 37  08/22/2019    LDL 80 04/15/2019         Assessment/Plan   Fred was seen today for diabetes.    Diagnoses and all orders for this visit:    Type 1 diabetes mellitus without complication (CMS/[facility])  -     NOVOLOG 100 UNIT/ML injection; average 50u daily via insulin pump  -     Lancets (FREESTYLE) lancets; Test 8 times daily  -     FREESTYLE LITE test strip; Test 8 times daily  -     T4 & TSH ([facility]); Future  -     Uric Acid; Future  -     Vitamin D 25 Hydroxy; Future  -     Comprehensive Metabolic Panel; Future  -     C-Peptide; Future  -     Hemoglobin A1c; Future  -     MicroAlbumin, Urine, Random - Urine, Clean Catch; Future  -     Glutamic Acid Decarboxylase; Future  -     NMR LipoProfile; Future    Mixed hyperlipidemia  -     rosuvastatin (CRESTOR) 40 MG tablet; Take 1 tablet by mouth Daily.  -     T4 & TSH ([facility]); Future  -     Uric Acid; Future  -     Vitamin D 25 Hydroxy; Future  -     Comprehensive Metabolic Panel; Future  -     C-Peptide; Future  -     Hemoglobin A1c; Future  -     MicroAlbumin, Urine, Random - Urine, Clean Catch; Future  -     Glutamic Acid Decarboxylase; Future  -     NMR LipoProfile; Future    Essential hypertension  -     T4 & TSH ([facility]); Future  -     Uric Acid; Future  -     Vitamin D 25 Hydroxy; Future  -     Comprehensive Metabolic Panel; Future  -     C-Peptide; Future  -     Hemoglobin A1c; Future  -     MicroAlbumin, Urine, Random - Urine, Clean Catch; Future  -     Glutamic Acid Decarboxylase; Future  -     NMR LipoProfile; Future    Avitaminosis D  -     T4 & TSH ([facility]); Future  -     Uric Acid; Future  -     Vitamin D 25 Hydroxy; Future  -     Comprehensive Metabolic Panel; Future  -     C-Peptide; Future  -     Hemoglobin A1c; Future  -     MicroAlbumin, Urine, Random - Urine, Clean Catch; Future  -     Glutamic Acid Decarboxylase; Future  -     NMR LipoProfile; Future    Long-term insulin use (CMS/[facility])  -     T4 & TSH ([facility]); Future  -     Uric Acid; Future  -      Vitamin D 25 Hydroxy; Future  -     Comprehensive Metabolic Panel; Future  -     C-Peptide; Future  -     Hemoglobin A1c; Future  -     MicroAlbumin, Urine, Random - Urine, Clean Catch; Future  -     Glutamic Acid Decarboxylase; Future  -     NMR LipoProfile; Future    Counseling for insulin pump  -     T4 & TSH ([facility]); Future  -     Uric Acid; Future  -     Vitamin D 25 Hydroxy; Future  -     Comprehensive Metabolic Panel; Future  -     C-Peptide; Future  -     Hemoglobin A1c; Future  -     MicroAlbumin, Urine, Random - Urine, Clean Catch; Future  -     Glutamic Acid Decarboxylase; Future  -     NMR LipoProfile; Future      In summary I saw and examined this a 59-year-old gentleman for above-mentioned problems.  I reviewed his laboratory evaluations of 1/22/2020 and provided him with a hard copy of it.  Aside from raised hemoglobin A1c he is otherwise clinically and metabolically stable.  I reached out to Ms. Nancy Polo from [employer] to work with Mr. Nagel for improving his glycemic control.  If he is still is in [place], he will see Ms. Kendal Lundberg in 4 months or sooner if needed with laboratory evaluation prior to each office visit.

## 2020-05-15 ENCOUNTER — RESULTS ENCOUNTER (OUTPATIENT)
Dept: ENDOCRINOLOGY | Age: 60
End: 2020-05-15

## 2020-05-15 DIAGNOSIS — E55.9 AVITAMINOSIS D: ICD-10-CM

## 2020-05-15 DIAGNOSIS — E78.2 MIXED HYPERLIPIDEMIA: ICD-10-CM

## 2020-05-15 DIAGNOSIS — Z79.4 LONG-TERM INSULIN USE (HCC): ICD-10-CM

## 2020-05-15 DIAGNOSIS — Z46.81 COUNSELING FOR INSULIN PUMP: ICD-10-CM

## 2020-05-15 DIAGNOSIS — E10.9 TYPE 1 DIABETES MELLITUS WITHOUT COMPLICATION (HCC): ICD-10-CM

## 2020-05-15 DIAGNOSIS — I10 ESSENTIAL HYPERTENSION: ICD-10-CM

## 2020-06-02 ENCOUNTER — TELEPHONE (OUTPATIENT)
Dept: ENDOCRINOLOGY | Age: 60
End: 2020-06-02

## 2020-06-02 ENCOUNTER — TELEPHONE (OUTPATIENT)
Dept: INTERNAL MEDICINE | Age: 60
End: 2020-06-02

## 2020-06-02 NOTE — TELEPHONE ENCOUNTER
PATIENT CALLED AND STATED THAT HE WOULD LIKE A CALLBACK FROM THE NP OR HER ASSISTANT HE WOULD LIKE TO DISCUSS SOME THINGS.     PLEASE ADVISE PATIENT -[phone].

## 2020-06-02 NOTE — TELEPHONE ENCOUNTER
Spoke with pt   He needed supplice for his pump . He was trying to call Dr carbajal office , but no one returned his call as he said     Per pt request I called there office and requesting to call pt back

## 2020-06-02 NOTE — TELEPHONE ENCOUNTER
Pt called and is needing refills on his pump supplies. PT used [employer]tronics. Can you look into this? Pt has moved to [place] and is willing to do a virtual visit. Pt is currently out. He states that he moved during the pandemic and has been unable to to find a provider or PCP in [place] due to pandemic and hoping we can still help him with this.

## 2020-06-04 NOTE — TELEPHONE ENCOUNTER
CMN for pump supplies faxed 06/03/2020 at 10:22AM. Please call patient and schedule telephone visit in July. NP slot ok to use.

## 2020-07-01 DIAGNOSIS — E78.2 MIXED HYPERLIPIDEMIA: ICD-10-CM

## 2020-07-02 RX ORDER — ROSUVASTATIN CALCIUM 40 MG/1
40 TABLET, COATED ORAL DAILY
Qty: 90 TABLET | Refills: 3 | Status: SHIPPED | OUTPATIENT
Start: 2020-07-02 | End: 2020-07-15 | Stop reason: SDUPTHER

## 2020-07-15 ENCOUNTER — OFFICE VISIT (OUTPATIENT)
Dept: ENDOCRINOLOGY | Age: 60
End: 2020-07-15

## 2020-07-15 ENCOUNTER — TELEPHONE (OUTPATIENT)
Dept: INTERNAL MEDICINE | Age: 60
End: 2020-07-15

## 2020-07-15 DIAGNOSIS — E78.2 MIXED HYPERLIPIDEMIA: ICD-10-CM

## 2020-07-15 DIAGNOSIS — E10.9 TYPE 1 DIABETES MELLITUS WITHOUT COMPLICATION (HCC): Primary | ICD-10-CM

## 2020-07-15 DIAGNOSIS — I10 ESSENTIAL HYPERTENSION: ICD-10-CM

## 2020-07-15 DIAGNOSIS — E10.9 TYPE 1 DIABETES MELLITUS WITHOUT COMPLICATION (HCC): ICD-10-CM

## 2020-07-15 DIAGNOSIS — E55.9 AVITAMINOSIS D: ICD-10-CM

## 2020-07-15 DIAGNOSIS — Z46.81 COUNSELING FOR INSULIN PUMP: ICD-10-CM

## 2020-07-15 DIAGNOSIS — Z79.4 LONG-TERM INSULIN USE (HCC): ICD-10-CM

## 2020-07-15 PROCEDURE — 99443 PR PHYS/QHP TELEPHONE EVALUATION 21-30 MIN: CPT | Performed by: INTERNAL MEDICINE

## 2020-07-15 RX ORDER — ROSUVASTATIN CALCIUM 40 MG/1
40 TABLET, COATED ORAL DAILY
Qty: 90 TABLET | Refills: 3 | Status: SHIPPED | OUTPATIENT
Start: 2020-07-15 | End: 2021-07-15

## 2020-07-15 RX ORDER — GLUCAGON INJECTION, SOLUTION 1 MG/.2ML
1 INJECTION, SOLUTION SUBCUTANEOUS AS NEEDED
Qty: 1.2 ML | Refills: 3 | Status: SHIPPED | OUTPATIENT
Start: 2020-07-15

## 2020-07-15 NOTE — TELEPHONE ENCOUNTER
CHEO CALLED IN TO REQUEST A REFILL OF NOVOLOG 100 UNIT/ML injection    QUANITIY OF 90  QUANITY           SENT TO [facility] - [place], [place] - [address] [place] [address] - [phone] [facility] [phone] FX  [phone]        CHEO CALL BACK   [phone].

## 2020-07-15 NOTE — PROGRESS NOTES
Subjective   Fred Nagel is a 59 y.o. male seen for follow up for DM1, hyperlipidemia, HTN, vit d deficiency. No lab review. Patient denies any problems or concerns. He is currently using a Medtronic insulin pump and changing pump sites every 3 days. He is checking BG 8 times a day.     History of Present Illness this is a 59-year-old gentleman known patient with type 1 diabetes as well as hypertension and dyslipidemia.  Over the course of last 6 months he has had no significantly health problems for which to go to the ER or hospital.  He is currently on Medtronic insulin pump and checks his blood sugar 8 times daily.    No Known Allergies    Current Outpatient Medications:   •  aspirin 81 MG EC tablet, Take 81 mg by mouth Daily., Disp: , Rfl:   •  Blood Glucose Monitoring Suppl (FREESTYLE LITE) device, Use to test blood glucose, Disp: 1 each, Rfl: 0  •  cetirizine (zyrTEC) 10 MG tablet, TAKE 1 TABLET DAILY (NEED APPOINTMENT FOR FURTHER REFILLS), Disp: 90 tablet, Rfl: 4  •  cyclobenzaprine (FLEXERIL) 5 MG tablet, Take 1 tablet by mouth 3 (Three) Times a Day As Needed for Muscle Spasms., Disp: 20 tablet, Rfl: 0  •  FREESTYLE LITE test strip, Test 8 times daily, Disp: 800 each, Rfl: 2  •  glucagon (GLUCAGON EMERGENCY) 1 MG injection, Inject 1 mg under the skin into the appropriate area as directed 1 (One) Time As Needed for Low Blood Sugar for up to 1 dose., Disp: 2 kit, Rfl: 2  •  hydroCHLOROthiazide (HYDRODIURIL) 25 MG tablet, 1 tablet daily by mouth, Disp: 90 tablet, Rfl: 1  •  Insulin Pen Needle 31G X 4 MM misc, 1 pen 4 (Four) Times a Day., Disp: 400 each, Rfl: 2  •  Lancets (FREESTYLE) lancets, Test 8 times daily, Disp: 800 each, Rfl: 2  •  lisinopril (PRINIVIL,ZESTRIL) 10 MG tablet, 1 tablet daily by mouth, Disp: 90 tablet, Rfl: 1  •  Multiple Vitamins-Minerals (MULTIVITAMIN WITH MINERALS) tablet tablet, Take 1 tablet by mouth Daily., Disp: , Rfl:   •  NOVOLOG 100 UNIT/ML injection, average 50u daily via  insulin pump, Disp: 60 mL, Rfl: 3  •  rosuvastatin (CRESTOR) 40 MG tablet, Take 1 tablet by mouth Daily., Disp: 90 tablet, Rfl: 3    The following portions of the patient's history were reviewed and updated as appropriate: allergies, current medications, past family history, past medical history, past social history, past surgical history and problem list.    Review of Systems   Constitutional: Negative.    HENT: Negative.    Eyes: Negative.    Respiratory: Negative.    Cardiovascular: Negative.    Gastrointestinal: Negative.    Endocrine: Negative.    Genitourinary: Negative.    Musculoskeletal: Negative.    Skin: Negative.    Allergic/Immunologic: Negative.    Neurological: Negative.    Hematological: Negative.    Psychiatric/Behavioral: Negative.    The above review of system was reviewed, corroborated and accepted.    Objective   Physical Exam    This being a telephone encounter there was no opportunity for physical exam however he was alert and oriented and did not sound to be in any acute or chronic distress.  He was coherent and articulate in his verbal expressions.  Assessment/Plan   Fred was seen today for diabetes.    Diagnoses and all orders for this visit:    Type 1 diabetes mellitus without complication (CMS/[facility])  -     T4 & TSH ([facility]); Future  -     T3, Free; Future  -     T4, Free; Future  -     Thyroglobulin With Anti-TG; Future  -     Uric Acid; Future  -     Vitamin D 25 Hydroxy; Future  -     Comprehensive Metabolic Panel; Future  -     Hemoglobin A1c; Future  -     NMR LipoProfile; Future  -     PSA DIAGNOSTIC; Future  -     TestT+TestF+SHBG; Future  -     CBC & Differential; Future  -     NOVOLOG 100 UNIT/ML injection; average 50u daily via insulin pump  -     T4 & TSH ([facility]); Future  -     TestT+TestF+SHBG; Future  -     Uric Acid; Future  -     Vitamin D 25 Hydroxy; Future  -     Comprehensive Metabolic Panel; Future  -     Hemoglobin A1c; Future  -     NMR LipoProfile; Future  -      CBC & Differential; Future    Mixed hyperlipidemia  -     T4 & TSH ([facility]); Future  -     T3, Free; Future  -     T4, Free; Future  -     Thyroglobulin With Anti-TG; Future  -     Uric Acid; Future  -     Vitamin D 25 Hydroxy; Future  -     Comprehensive Metabolic Panel; Future  -     Hemoglobin A1c; Future  -     NMR LipoProfile; Future  -     PSA DIAGNOSTIC; Future  -     TestT+TestF+SHBG; Future  -     CBC & Differential; Future  -     rosuvastatin (CRESTOR) 40 MG tablet; Take 1 tablet by mouth Daily.  -     T4 & TSH ([facility]); Future  -     TestT+TestF+SHBG; Future  -     Uric Acid; Future  -     Vitamin D 25 Hydroxy; Future  -     Comprehensive Metabolic Panel; Future  -     Hemoglobin A1c; Future  -     NMR LipoProfile; Future  -     CBC & Differential; Future    Essential hypertension  -     T4 & TSH ([facility]); Future  -     T3, Free; Future  -     T4, Free; Future  -     Thyroglobulin With Anti-TG; Future  -     Uric Acid; Future  -     Vitamin D 25 Hydroxy; Future  -     Comprehensive Metabolic Panel; Future  -     Hemoglobin A1c; Future  -     NMR LipoProfile; Future  -     PSA DIAGNOSTIC; Future  -     TestT+TestF+SHBG; Future  -     CBC & Differential; Future  -     T4 & TSH ([facility]); Future  -     TestT+TestF+SHBG; Future  -     Uric Acid; Future  -     Vitamin D 25 Hydroxy; Future  -     Comprehensive Metabolic Panel; Future  -     Hemoglobin A1c; Future  -     NMR LipoProfile; Future  -     CBC & Differential; Future    Avitaminosis D  -     T4 & TSH ([facility]); Future  -     T3, Free; Future  -     T4, Free; Future  -     Thyroglobulin With Anti-TG; Future  -     Uric Acid; Future  -     Vitamin D 25 Hydroxy; Future  -     Comprehensive Metabolic Panel; Future  -     Hemoglobin A1c; Future  -     NMR LipoProfile; Future  -     PSA DIAGNOSTIC; Future  -     TestT+TestF+SHBG; Future  -     CBC & Differential; Future  -     T4 & TSH ([facility]); Future  -     TestT+TestF+SHBG; Future  -     Uric Acid;  Future  -     Vitamin D 25 Hydroxy; Future  -     Comprehensive Metabolic Panel; Future  -     Hemoglobin A1c; Future  -     NMR LipoProfile; Future  -     CBC & Differential; Future    Counseling for insulin pump  -     T4 & TSH ([facility]); Future  -     T3, Free; Future  -     T4, Free; Future  -     Thyroglobulin With Anti-TG; Future  -     Uric Acid; Future  -     Vitamin D 25 Hydroxy; Future  -     Comprehensive Metabolic Panel; Future  -     Hemoglobin A1c; Future  -     NMR LipoProfile; Future  -     PSA DIAGNOSTIC; Future  -     TestT+TestF+SHBG; Future  -     CBC & Differential; Future  -     T4 & TSH ([facility]); Future  -     TestT+TestF+SHBG; Future  -     Uric Acid; Future  -     Vitamin D 25 Hydroxy; Future  -     Comprehensive Metabolic Panel; Future  -     Hemoglobin A1c; Future  -     NMR LipoProfile; Future  -     CBC & Differential; Future    Long-term insulin use (CMS/HCC)  -     T4 & TSH ([facility]); Future  -     T3, Free; Future  -     T4, Free; Future  -     Thyroglobulin With Anti-TG; Future  -     Uric Acid; Future  -     Vitamin D 25 Hydroxy; Future  -     Comprehensive Metabolic Panel; Future  -     Hemoglobin A1c; Future  -     NMR LipoProfile; Future  -     PSA DIAGNOSTIC; Future  -     TestT+TestF+SHBG; Future  -     CBC & Differential; Future  -     T4 & TSH ([facility]); Future  -     TestT+TestF+SHBG; Future  -     Uric Acid; Future  -     Vitamin D 25 Hydroxy; Future  -     Comprehensive Metabolic Panel; Future  -     Hemoglobin A1c; Future  -     NMR LipoProfile; Future  -     CBC & Differential; Future    Other orders  -     GVOKE HYPOPEN 2-PACK 1 MG/0.2ML solution auto-injector; Inject 1 syringe under the skin into the appropriate area as directed As Needed (hypoglycemia).      In summary I had a telephone encounter with this gentleman and a 59-year-old for above-mentioned problems.  I reviewed his laboratory evaluations of 1/12/2020 and at this time we will go ahead and order  additional laboratory evaluation and once the results come back we will go ahead and call for any possible modification or new medications.  I will see him in 6 months or sooner if needed but laboratory evaluation prior to each office visit.  This office visit which included 15 minutes face-to-face counseling and education on the following with this patient lasted 25 minutes.

## 2020-07-17 DIAGNOSIS — E10.9 TYPE 1 DIABETES MELLITUS WITHOUT COMPLICATION (HCC): ICD-10-CM

## 2020-07-22 DIAGNOSIS — E10.9 TYPE 1 DIABETES MELLITUS WITHOUT COMPLICATION (HCC): ICD-10-CM

## 2020-08-03 DIAGNOSIS — E10.9 TYPE 1 DIABETES MELLITUS WITHOUT COMPLICATION (HCC): ICD-10-CM

## 2020-10-08 DIAGNOSIS — I10 ESSENTIAL HYPERTENSION: ICD-10-CM

## 2020-10-08 RX ORDER — LISINOPRIL 10 MG/1
TABLET ORAL
Qty: 30 TABLET | Refills: 0 | Status: CANCELLED | OUTPATIENT
Start: 2020-10-08

## 2020-10-08 RX ORDER — HYDROCHLOROTHIAZIDE 25 MG/1
TABLET ORAL
Qty: 30 TABLET | Refills: 0 | Status: CANCELLED | OUTPATIENT
Start: 2020-10-08

## 2020-10-20 DIAGNOSIS — I10 ESSENTIAL HYPERTENSION: ICD-10-CM

## 2020-10-20 RX ORDER — HYDROCHLOROTHIAZIDE 25 MG/1
TABLET ORAL
Qty: 90 TABLET | Refills: 0 | Status: SHIPPED | OUTPATIENT
Start: 2020-10-20

## 2020-10-20 RX ORDER — LISINOPRIL 10 MG/1
TABLET ORAL
Qty: 90 TABLET | Refills: 0 | Status: SHIPPED | OUTPATIENT
Start: 2020-10-20 | End: 2021-01-13 | Stop reason: SDUPTHER

## 2021-01-12 DIAGNOSIS — I10 ESSENTIAL HYPERTENSION: ICD-10-CM

## 2021-01-13 DIAGNOSIS — I10 ESSENTIAL HYPERTENSION: ICD-10-CM

## 2021-01-13 RX ORDER — LISINOPRIL 10 MG/1
TABLET ORAL
Qty: 30 TABLET | Refills: 0 | OUTPATIENT
Start: 2021-01-13

## 2021-01-13 RX ORDER — LISINOPRIL 10 MG/1
TABLET ORAL
Qty: 90 TABLET | Refills: 0 | Status: SHIPPED | OUTPATIENT
Start: 2021-01-13

## 2021-01-15 DIAGNOSIS — I10 ESSENTIAL HYPERTENSION: ICD-10-CM

## 2021-01-15 RX ORDER — HYDROCHLOROTHIAZIDE 25 MG/1
TABLET ORAL
Qty: 30 TABLET | Refills: 0 | OUTPATIENT
Start: 2021-01-15

## 2021-04-02 DIAGNOSIS — I10 ESSENTIAL HYPERTENSION: ICD-10-CM

## 2021-04-05 RX ORDER — LISINOPRIL 10 MG/1
TABLET ORAL
Qty: 30 TABLET | Refills: 0 | OUTPATIENT
Start: 2021-04-05

## 2021-04-05 RX ORDER — HYDROCHLOROTHIAZIDE 25 MG/1
TABLET ORAL
Qty: 90 TABLET | Refills: 0 | OUTPATIENT
Start: 2021-04-05

## 2021-04-07 DIAGNOSIS — J30.2 SEASONAL ALLERGIC RHINITIS: ICD-10-CM

## 2021-04-07 RX ORDER — CETIRIZINE HYDROCHLORIDE 10 MG/1
TABLET ORAL
Qty: 30 TABLET | Refills: 0 | Status: SHIPPED | OUTPATIENT
Start: 2021-04-07

## 2021-07-24 DIAGNOSIS — E10.9 TYPE 1 DIABETES MELLITUS WITHOUT COMPLICATION (HCC): ICD-10-CM
